# Patient Record
Sex: FEMALE | Race: ASIAN | NOT HISPANIC OR LATINO | Employment: FULL TIME | URBAN - METROPOLITAN AREA
[De-identification: names, ages, dates, MRNs, and addresses within clinical notes are randomized per-mention and may not be internally consistent; named-entity substitution may affect disease eponyms.]

---

## 2017-09-25 ENCOUNTER — AMBULATORY - HEALTHEAST (OUTPATIENT)
Dept: NURSING | Facility: CLINIC | Age: 32
End: 2017-09-25

## 2021-07-16 ENCOUNTER — HOSPITAL ENCOUNTER (EMERGENCY)
Dept: RADIOLOGY | Facility: CLINIC | Age: 36
End: 2021-07-16
Attending: EMERGENCY MEDICINE
Payer: COMMERCIAL

## 2021-07-16 ENCOUNTER — HOSPITAL ENCOUNTER (EMERGENCY)
Facility: CLINIC | Age: 36
Discharge: HOME | End: 2021-07-16
Attending: EMERGENCY MEDICINE | Admitting: EMERGENCY MEDICINE
Payer: COMMERCIAL

## 2021-07-16 VITALS
OXYGEN SATURATION: 99 % | BODY MASS INDEX: 29.27 KG/M2 | RESPIRATION RATE: 21 BRPM | HEIGHT: 61 IN | DIASTOLIC BLOOD PRESSURE: 75 MMHG | WEIGHT: 155 LBS | SYSTOLIC BLOOD PRESSURE: 122 MMHG | HEART RATE: 82 BPM

## 2021-07-16 DIAGNOSIS — R07.9 CHEST PAIN, UNSPECIFIED TYPE: Primary | ICD-10-CM

## 2021-07-16 LAB
ALBUMIN SERPL-MCNC: 3.9 G/DL (ref 3.5–5)
ALP SERPL-CCNC: 74 U/L (ref 45–120)
ALT SERPL W P-5'-P-CCNC: 11 U/L (ref 0–45)
ANION GAP SERPL CALCULATED.3IONS-SCNC: 11 MMOL/L (ref 5–18)
AST SERPL W P-5'-P-CCNC: 13 U/L (ref 0–40)
ATRIAL RATE - MUSE: 95 BPM
BILIRUB SERPL-MCNC: 0.5 MG/DL (ref 0–1)
BUN SERPL-MCNC: 17 MG/DL (ref 8–22)
CALCIUM SERPL-MCNC: 8.6 MG/DL (ref 8.5–10.5)
CHLORIDE BLD-SCNC: 106 MMOL/L (ref 98–107)
CK SERPL-CCNC: 79 U/L (ref 30–190)
CO2 SERPL-SCNC: 22 MMOL/L (ref 22–31)
CREAT SERPL-MCNC: 0.8 MG/DL (ref 0.6–1.1)
DIASTOLIC BLOOD PRESSURE - MUSE: 88 MMHG
ERYTHROCYTE [DISTWIDTH] IN BLOOD BY AUTOMATED COUNT: 12 % (ref 10–15)
GFR SERPL CREATININE-BSD FRML MDRD: >90 ML/MIN/1.73M2
GLUCOSE BLD-MCNC: 132 MG/DL (ref 70–125)
HCT VFR BLD AUTO: 35.9 % (ref 35–47)
HGB BLD-MCNC: 11.9 G/DL (ref 11.7–15.7)
INTERPRETATION ECG - MUSE: NORMAL
MAGNESIUM SERPL-MCNC: 1.8 MG/DL (ref 1.8–2.6)
MCH RBC QN AUTO: 29.4 PG (ref 26.5–33)
MCHC RBC AUTO-ENTMCNC: 33.1 G/DL (ref 31.5–36.5)
MCV RBC AUTO: 89 FL (ref 78–100)
P AXIS - MUSE: 65 DEGREES
PLATELET # BLD AUTO: 214 10E3/UL (ref 150–450)
POTASSIUM BLD-SCNC: 3.1 MMOL/L (ref 3.5–5)
PR INTERVAL - MUSE: 124 MS
PROT SERPL-MCNC: 6.9 G/DL (ref 6–8)
QRS DURATION - MUSE: 84 MS
QT - MUSE: 344 MS
QTC - MUSE: 432 MS
R AXIS - MUSE: 26 DEGREES
RBC # BLD AUTO: 4.05 10E6/UL (ref 3.8–5.2)
SODIUM SERPL-SCNC: 139 MMOL/L (ref 136–145)
SYSTOLIC BLOOD PRESSURE - MUSE: 177 MMHG
T AXIS - MUSE: 34 DEGREES
TROPONIN I SERPL-MCNC: 0.01 NG/ML (ref 0–0.29)
VENTRICULAR RATE- MUSE: 95 BPM
WBC # BLD AUTO: 9.8 10E3/UL (ref 4–11)

## 2021-07-16 PROCEDURE — 71045 X-RAY EXAM CHEST 1 VIEW: CPT

## 2021-07-16 PROCEDURE — 83735 ASSAY OF MAGNESIUM: CPT | Performed by: EMERGENCY MEDICINE

## 2021-07-16 PROCEDURE — 84484 ASSAY OF TROPONIN QUANT: CPT | Performed by: EMERGENCY MEDICINE

## 2021-07-16 PROCEDURE — 93005 ELECTROCARDIOGRAM TRACING: CPT | Performed by: EMERGENCY MEDICINE

## 2021-07-16 PROCEDURE — 80053 COMPREHEN METABOLIC PANEL: CPT | Performed by: EMERGENCY MEDICINE

## 2021-07-16 PROCEDURE — 85027 COMPLETE CBC AUTOMATED: CPT | Performed by: EMERGENCY MEDICINE

## 2021-07-16 PROCEDURE — 82550 ASSAY OF CK (CPK): CPT | Performed by: EMERGENCY MEDICINE

## 2021-07-16 PROCEDURE — 99285 EMERGENCY DEPT VISIT HI MDM: CPT | Mod: 25

## 2021-07-16 PROCEDURE — 96374 THER/PROPH/DIAG INJ IV PUSH: CPT

## 2021-07-16 PROCEDURE — 36592 COLLECT BLOOD FROM PICC: CPT | Performed by: EMERGENCY MEDICINE

## 2021-07-16 PROCEDURE — 250N000011 HC RX IP 250 OP 636: Performed by: EMERGENCY MEDICINE

## 2021-07-16 PROCEDURE — 36415 COLL VENOUS BLD VENIPUNCTURE: CPT | Performed by: EMERGENCY MEDICINE

## 2021-07-16 RX ORDER — POTASSIUM CHLORIDE 1.5 G/1.58G
40 POWDER, FOR SOLUTION ORAL ONCE
Status: DISCONTINUED | OUTPATIENT
Start: 2021-07-16 | End: 2021-07-16 | Stop reason: HOSPADM

## 2021-07-16 RX ORDER — HYDROXYZINE PAMOATE 25 MG/1
25 CAPSULE ORAL 3 TIMES DAILY PRN
Qty: 12 CAPSULE | Refills: 0 | Status: SHIPPED | OUTPATIENT
Start: 2021-07-16

## 2021-07-16 RX ORDER — LORAZEPAM 2 MG/ML
0.5 INJECTION INTRAMUSCULAR ONCE
Status: COMPLETED | OUTPATIENT
Start: 2021-07-16 | End: 2021-07-16

## 2021-07-16 RX ADMIN — LORAZEPAM 0.5 MG: 2 INJECTION INTRAMUSCULAR; INTRAVENOUS at 01:34

## 2021-07-16 ASSESSMENT — ENCOUNTER SYMPTOMS
VOMITING: 0
FEVER: 0
ABDOMINAL PAIN: 0
COUGH: 0
NAUSEA: 1
SHORTNESS OF BREATH: 1

## 2021-07-16 ASSESSMENT — MIFFLIN-ST. JEOR: SCORE: 1325.46

## 2021-07-16 NOTE — ED PROVIDER NOTES
EMERGENCY DEPARTMENT ENCOUNTER      NAME: Annette Dietz  AGE: 35 year old female  YOB: 1985  MRN: [id number]  EVALUATION DATE & TIME: 7/16/2021  1:08 AM    PCP: No primary care provider on file.    ED PROVIDER: Serge Horner M.D.      Chief Complaint   Patient presents with     Chest Pain     Shortness of Breath         FINAL IMPRESSION:  1. Chest pain, unspecified type          ED COURSE & MEDICAL DECISION MAKING:    Pertinent Labs & Imaging studies reviewed. (See chart for details)  35 year old female presents to the Emergency Department for evaluation of chest pain and paresthesias. Presentation atypical for ACS. HEART score = 0. Low risk of MACE in short term. Doubt PE in this patient without dyspnea, leg swelling, hemoptysis. No h/o recent travel or immobilization. No exogenous estrogen use. No h/o dvt/pe or neoplasm. CXR unremarkable. Patient and spouse believe presentation may be d/t anxiety from increased stress. Hydroxyzine prescribed. Strict return precautions and anticipatory guidance given.        ED Course as of Jul 16 0352 Fri Jul 16, 2021   0121 35-year-old female presents with complaint of burning in the left side of her chest her bilateral arms or bilateral lower extremities.  Patient reports symptoms began with patient got up to go the bathroom middle the night.  She denies history of similar episodes.  She does report increased workout today after not working out well.  She denies a history of estrogen use.  She denies a history of recent surgery or immobilization.  She is not had recent travel.  She does not have a personal or family history of DVT or pulmonary [facility].      0121 Presentation is atypical for acute coronary syndrome and EKG is nonischemic.  Doubt pulmonary embolism in this PERC negative female.      0122 Presentation is not consistent with aortic dissection.  Will obtain chest x-ray.  Will obtain electrolytes.  Will obtain CK.  Provide IV hydration.  Patient has  been asked about increased stress as a cause of this.  Presentation could certainly be consistent with anxiety attack or panic attack.  However, will attempt to rule out significant cardiopulmonary disease      0211 Troponin is negative.  Slightly decreased potassium noted.  CK unremarkable.      0211 Chest x-ray unremarkable      0350 Anion Gap: 11        1:15 AM I met with the patient, obtained history, performed an initial exam, and discussed options and plan for diagnostics and treatment here in the ED.  2:23 AM  Updated patient and spouse. Reevaluated the patient.     At the conclusion of the encounter I discussed the results of all of the tests and the disposition. The questions were answered. The patient or family acknowledged understanding and was agreeable with the care plan.       0 minutes of critical care time   No current facility-administered medications for this encounter.     Current Outpatient Medications   Medication     hydrOXYzine (VISTARIL) 25 MG capsule     cholecalciferol, vitamin D3, 5,000 unit Tab     MEDICATION CANNOT BE REORDERED - PLEASE MANUALLY REORDER AND DISCONTINUE THE OLD ORDER     PRENATAL VIT W-CA,FE,FA,<1 MG, (PRENATAL VITAMIN ORAL)       MEDICATIONS GIVEN IN THE EMERGENCY:  Medications   0.9% sodium chloride BOLUS (has no administration in time range)   potassium chloride (KLOR-CON) Packet 40 mEq (has no administration in time range)   LORazepam (ATIVAN) injection 0.5 mg (0.5 mg Intravenous Given 7/16/21 0134)       NEW PRESCRIPTIONS STARTED AT TODAY'S ER VISIT  Discharge Medication List as of 7/16/2021  2:50 AM      START taking these medications    Details   hydrOXYzine (VISTARIL) 25 MG capsule Take 1 capsule (25 mg) by mouth 3 times daily as needed for itching or anxiety, Disp-12 capsule, R-0, Local Print                =================================================================    HPI    Patient information was obtained from:     Use of Intrepreter: N/A      Annette Dietz  is a 35 year old female with a pertinent history of gestational diabetes who presents to this ED by walk in for evaluation of chest pain, shortness of breath.     The patient states that tonight just before arrival she developed sudden onset, sharp, left chest pain and she felt short of breath. Her arms started to burn and tingle and she says her heart was burning. She also felt nauseated but no vomiting. She states she worked out today for the first time in awhile and was wondering if this had something to do with it. She denies any cough, leg swelling or pain, or any other symptoms. She had her first dose of the Pfizer COVID vaccine about a week ago. She started taking penicillin for an ear infection 2-3 weeks ago. She denies any medical problems, daily medications, or prior surgeries.      REVIEW OF SYSTEMS   Review of Systems   Constitutional: Negative for fever.   Respiratory: Positive for shortness of breath. Negative for cough.    Cardiovascular: Positive for chest pain. Negative for leg swelling.   Gastrointestinal: Positive for nausea. Negative for abdominal pain and vomiting.   Neurological:        Positive for tingling (resolved)   All other systems reviewed and are negative.       PAST MEDICAL HISTORY:  No past medical history on file.    PAST SURGICAL HISTORY:  Past Surgical History:   Procedure Laterality Date     WISDOM TOOTH EXTRACTION      no comp with [place]           CURRENT MEDICATIONS:    hydrOXYzine (VISTARIL) 25 MG capsule  cholecalciferol, vitamin D3, 5,000 unit Tab  MEDICATION CANNOT BE REORDERED - PLEASE MANUALLY REORDER AND DISCONTINUE THE OLD ORDER  PRENATAL VIT W-CA,FE,FA,<1 MG, (PRENATAL VITAMIN ORAL)        ALLERGIES:  No Known Allergies    FAMILY HISTORY:  Family History   Problem Relation Age of Onset     Depression Mother      Hypertension Father      Hepatitis Brother      No Known Problems Sister      No Known Problems Daughter      No Known Problems Son      No Known Problems  "Paternal Grandmother      No Known Problems Sister        SOCIAL HISTORY:   Social History     Socioeconomic History     Marital status: Single     Spouse name: Not on file     Number of children: Not on file     Years of education: Not on file     Highest education level: Not on file   Occupational History     Not on file   Tobacco Use     Smoking status: Never Smoker   Substance and Sexual Activity     Alcohol use: No     Drug use: No     Sexual activity: Yes     Partners: Male     Birth control/protection: None   Other Topics Concern     Not on file   Social History Narrative     Not on file     Social Determinants of Health     Financial Resource Strain:      Difficulty of Paying Living Expenses:    Food Insecurity:      Worried About Running Out of Food in the Last Year:      Ran Out of Food in the Last Year:    Transportation Needs:      Lack of Transportation (Medical):      Lack of Transportation (Non-Medical):    Physical Activity:      Days of Exercise per Week:      Minutes of Exercise per Session:    Stress:      Feeling of Stress :    Social Connections:      Frequency of Communication with Friends and Family:      Frequency of Social Gatherings with Friends and Family:      Attends Shinto Services:      Active Member of Clubs or Organizations:      Attends Club or Organization Meetings:      Marital Status:    Intimate Partner Violence:      Fear of Current or Ex-Partner:      Emotionally Abused:      Physically Abused:      Sexually Abused:        VITALS:  Patient Vitals for the past 24 hrs:   BP Temp src Pulse Resp SpO2 Height Weight   07/16/21 0245 122/75 -- 82 21 99 % -- 70.3 kg (155 lb)   07/16/21 0230 -- -- 81 10 100 % -- --   07/16/21 0145 -- -- 85 8 100 % -- --   07/16/21 0130 (!) 146/78 -- 86 18 100 % -- --   07/16/21 0116 (!) 177/88 Oral 101 24 100 % 1.549 m (5' 1\") --   07/16/21 0115 (!) 158/68 -- 98 30 100 % -- --       PHYSICAL EXAM    General: A&O x 3 no apparent distress  HEENT: " PERRL, EOMI, moist mucous membranes  Neck: Supple, no rigidity  Cardiovascular: 2+ distal pulses, cap refill less than 2 seconds. RRR No m/r/g  Pulmonary: Chest nontender, symmetrical rise, normal effort, no respiratory distress. Clear to auscultation bilaterally.  Abdomen: Nontender, no distention. No rebound, guarding, or rigidity.  Extremities: No clubbing, cyanosis, or edema  Musculoskeletal: Gait normal; extremities atraumatic x4  Neuro: Cranial nerves II through XII intact, GCS 15; intact, symmetric strength and sensation x4 extremities  Skin: No rash, jaundice, pallor.  Warm dry and intact  Psych: anxious mood and affect       LAB:  All pertinent labs reviewed and interpreted.  Results for orders placed or performed during the hospital encounter of 07/16/21   XR Chest Port 1 View    Impression    IMPRESSION: Negative chest.   CBC with platelets   Result Value Ref Range    WBC Count 9.8 4.0 - 11.0 10e3/uL    RBC Count 4.05 3.80 - 5.20 10e6/uL    Hemoglobin 11.9 11.7 - 15.7 g/dL    Hematocrit 35.9 35.0 - 47.0 %    MCV 89 78 - 100 fL    MCH 29.4 26.5 - 33.0 pg    MCHC 33.1 31.5 - 36.5 g/dL    RDW 12.0 10.0 - 15.0 %    Platelet Count 214 150 - 450 10e3/uL   Comprehensive metabolic panel   Result Value Ref Range    Sodium 139 136 - 145 mmol/L    Potassium 3.1 (L) 3.5 - 5.0 mmol/L    Chloride 106 98 - 107 mmol/L    Carbon Dioxide (CO2) 22 22 - 31 mmol/L    Anion Gap 11 5 - 18 mmol/L    Urea Nitrogen 17 8 - 22 mg/dL    Creatinine 0.80 0.60 - 1.10 mg/dL    Calcium 8.6 8.5 - 10.5 mg/dL    Glucose 132 (H) 70 - 125 mg/dL    Alkaline Phosphatase 74 45 - 120 U/L    AST 13 0 - 40 U/L    ALT 11 0 - 45 U/L    Protein Total 6.9 6.0 - 8.0 g/dL    Albumin 3.9 3.5 - 5.0 g/dL    Bilirubin Total 0.5 0.0 - 1.0 mg/dL    GFR Estimate >90 >60 mL/min/1.73m2   Result Value Ref Range    Magnesium 1.8 1.8 - 2.6 mg/dL   Troponin I (now)   Result Value Ref Range    Troponin I 0.01 0.00 - 0.29 ng/mL   Result Value Ref Range    CK 79 30 - 190  U/L       RADIOLOGY:  Reviewed all pertinent imaging. Please see official radiology report.  XR Chest Port 1 View   Final Result   IMPRESSION: Negative chest.            EKG:    Performed at: 113    Impression: NSR, no significant ST changes,    Rate: 95   Rhythm: sinus  Axis: 65  26  34  ID Interval: 124  QRS Interval: 84  QTc Interval: 432  ST Changes: none  Comparison: no change when compared to 18 Jan 2016    I have independently reviewed and interpreted the EKG(s) documented above.      I, Guero Tim, am serving as a scribe to document services personally performed by Dr. Horner based on my observation and the provider's statements to me. I, Serge Horner MD attest that Guero Tim is acting in a scribe capacity, has observed my performance of the services and has documented them in accordance with my direction.  Any spelling or grammatic inconsistencies or inaccuracies are typographical or dictation errors.    Serge Horner M.D.  Emergency Medicine  [facility] EMERGENCY ROOM  [address] [facility] [address]  [phone]  Dept: [phone]     Serge Horner MD  07/16/21 2039

## 2021-07-16 NOTE — ED TRIAGE NOTES
Pt states that she was woken up by there younger child, she was sob went to the bathroom, laid down to sleep and then developed left sided chest pain that radiates down her left arm, here hands and lips became numb.  Her chest had a burning sensation  Pt had a covid shot x 1 week ago

## 2021-07-16 NOTE — DISCHARGE INSTRUCTIONS
The rapid access cardiology clinic will contact you to arrange follow-up    Return the emergency department your symptoms return or not relieved immediately by the hydroxyzine prescribed    Return to the emergency department immediately if any new or concerning symptoms occur

## 2021-07-18 ENCOUNTER — HEALTH MAINTENANCE LETTER (OUTPATIENT)
Age: 36
End: 2021-07-18

## 2021-07-23 ENCOUNTER — TELEPHONE (OUTPATIENT)
Dept: CARDIOLOGY | Facility: CLINIC | Age: 36
End: 2021-07-23

## 2021-09-12 ENCOUNTER — HEALTH MAINTENANCE LETTER (OUTPATIENT)
Age: 36
End: 2021-09-12

## 2022-04-06 ENCOUNTER — APPOINTMENT (OUTPATIENT)
Dept: RADIOLOGY | Facility: CLINIC | Age: 37
End: 2022-04-06
Attending: EMERGENCY MEDICINE
Payer: COMMERCIAL

## 2022-04-06 ENCOUNTER — APPOINTMENT (OUTPATIENT)
Dept: CT IMAGING | Facility: CLINIC | Age: 37
End: 2022-04-06
Attending: EMERGENCY MEDICINE
Payer: COMMERCIAL

## 2022-04-06 ENCOUNTER — HOSPITAL ENCOUNTER (EMERGENCY)
Facility: CLINIC | Age: 37
Discharge: HOME | End: 2022-04-06
Attending: EMERGENCY MEDICINE | Admitting: EMERGENCY MEDICINE
Payer: COMMERCIAL

## 2022-04-06 VITALS
BODY MASS INDEX: 23.6 KG/M2 | DIASTOLIC BLOOD PRESSURE: 80 MMHG | TEMPERATURE: 99 F | HEIGHT: 61 IN | SYSTOLIC BLOOD PRESSURE: 129 MMHG | HEART RATE: 72 BPM | RESPIRATION RATE: 20 BRPM | OXYGEN SATURATION: 99 % | WEIGHT: 125 LBS

## 2022-04-06 DIAGNOSIS — G44.219 EPISODIC TENSION-TYPE HEADACHE, NOT INTRACTABLE: ICD-10-CM

## 2022-04-06 DIAGNOSIS — R07.9 CHEST PAIN, UNSPECIFIED TYPE: ICD-10-CM

## 2022-04-06 LAB
ALBUMIN SERPL-MCNC: 4.3 G/DL (ref 3.5–5)
ALBUMIN UR-MCNC: NEGATIVE MG/DL
ALP SERPL-CCNC: 61 U/L (ref 45–120)
ALT SERPL W P-5'-P-CCNC: 12 U/L (ref 0–45)
ANION GAP SERPL CALCULATED.3IONS-SCNC: 10 MMOL/L (ref 5–18)
APPEARANCE UR: CLEAR
AST SERPL W P-5'-P-CCNC: 16 U/L (ref 0–40)
BASOPHILS # BLD AUTO: 0.1 10E3/UL (ref 0–0.2)
BASOPHILS NFR BLD AUTO: 1 %
BILIRUB SERPL-MCNC: 0.9 MG/DL (ref 0–1)
BILIRUB UR QL STRIP: NEGATIVE
BUN SERPL-MCNC: 11 MG/DL (ref 8–22)
CALCIUM SERPL-MCNC: 9.6 MG/DL (ref 8.5–10.5)
CHLORIDE BLD-SCNC: 102 MMOL/L (ref 98–107)
CO2 SERPL-SCNC: 26 MMOL/L (ref 22–31)
COLOR UR AUTO: ABNORMAL
CREAT SERPL-MCNC: 0.88 MG/DL (ref 0.6–1.1)
EOSINOPHIL # BLD AUTO: 0.1 10E3/UL (ref 0–0.7)
EOSINOPHIL NFR BLD AUTO: 2 %
ERYTHROCYTE [DISTWIDTH] IN BLOOD BY AUTOMATED COUNT: 12.1 % (ref 10–15)
GFR SERPL CREATININE-BSD FRML MDRD: 86 ML/MIN/1.73M2
GLUCOSE BLD-MCNC: 113 MG/DL (ref 70–125)
GLUCOSE UR STRIP-MCNC: NEGATIVE MG/DL
HCG UR QL: NEGATIVE
HCT VFR BLD AUTO: 40.9 % (ref 35–47)
HGB BLD-MCNC: 13.3 G/DL (ref 11.7–15.7)
HGB UR QL STRIP: NEGATIVE
IMM GRANULOCYTES # BLD: 0 10E3/UL
IMM GRANULOCYTES NFR BLD: 0 %
INTERNAL QC OK POCT: NORMAL
KETONES UR STRIP-MCNC: NEGATIVE MG/DL
LEUKOCYTE ESTERASE UR QL STRIP: NEGATIVE
LYMPHOCYTES # BLD AUTO: 1.4 10E3/UL (ref 0.8–5.3)
LYMPHOCYTES NFR BLD AUTO: 26 %
MCH RBC QN AUTO: 29.1 PG (ref 26.5–33)
MCHC RBC AUTO-ENTMCNC: 32.5 G/DL (ref 31.5–36.5)
MCV RBC AUTO: 90 FL (ref 78–100)
MONOCYTES # BLD AUTO: 0.6 10E3/UL (ref 0–1.3)
MONOCYTES NFR BLD AUTO: 11 %
NEUTROPHILS # BLD AUTO: 3.3 10E3/UL (ref 1.6–8.3)
NEUTROPHILS NFR BLD AUTO: 60 %
NITRATE UR QL: NEGATIVE
NRBC # BLD AUTO: 0 10E3/UL
NRBC BLD AUTO-RTO: 0 /100
PH UR STRIP: 6.5 [PH] (ref 5–7)
PLATELET # BLD AUTO: 248 10E3/UL (ref 150–450)
POCT KIT EXPIRATION DATE: NORMAL
POCT KIT LOT NUMBER: NORMAL
POTASSIUM BLD-SCNC: 3.9 MMOL/L (ref 3.5–5)
PROT SERPL-MCNC: 7.7 G/DL (ref 6–8)
RBC # BLD AUTO: 4.57 10E6/UL (ref 3.8–5.2)
RBC URINE: 0 /HPF
SODIUM SERPL-SCNC: 138 MMOL/L (ref 136–145)
SP GR UR STRIP: 1.01 (ref 1–1.03)
SQUAMOUS EPITHELIAL: 2 /HPF
TROPONIN I SERPL-MCNC: <0.01 NG/ML (ref 0–0.29)
UROBILINOGEN UR STRIP-MCNC: <2 MG/DL
WBC # BLD AUTO: 5.4 10E3/UL (ref 4–11)
WBC URINE: <1 /HPF

## 2022-04-06 PROCEDURE — 81001 URINALYSIS AUTO W/SCOPE: CPT | Performed by: EMERGENCY MEDICINE

## 2022-04-06 PROCEDURE — 96374 THER/PROPH/DIAG INJ IV PUSH: CPT | Mod: 59

## 2022-04-06 PROCEDURE — 250N000011 HC RX IP 250 OP 636: Performed by: EMERGENCY MEDICINE

## 2022-04-06 PROCEDURE — 93005 ELECTROCARDIOGRAM TRACING: CPT | Performed by: EMERGENCY MEDICINE

## 2022-04-06 PROCEDURE — 99285 EMERGENCY DEPT VISIT HI MDM: CPT | Mod: 25

## 2022-04-06 PROCEDURE — 84484 ASSAY OF TROPONIN QUANT: CPT | Performed by: EMERGENCY MEDICINE

## 2022-04-06 PROCEDURE — 71046 X-RAY EXAM CHEST 2 VIEWS: CPT

## 2022-04-06 PROCEDURE — 80053 COMPREHEN METABOLIC PANEL: CPT | Performed by: EMERGENCY MEDICINE

## 2022-04-06 PROCEDURE — 81025 URINE PREGNANCY TEST: CPT | Performed by: EMERGENCY MEDICINE

## 2022-04-06 PROCEDURE — 96375 TX/PRO/DX INJ NEW DRUG ADDON: CPT

## 2022-04-06 PROCEDURE — 85025 COMPLETE CBC W/AUTO DIFF WBC: CPT | Performed by: EMERGENCY MEDICINE

## 2022-04-06 PROCEDURE — 258N000003 HC RX IP 258 OP 636: Performed by: EMERGENCY MEDICINE

## 2022-04-06 PROCEDURE — 96361 HYDRATE IV INFUSION ADD-ON: CPT

## 2022-04-06 PROCEDURE — 36415 COLL VENOUS BLD VENIPUNCTURE: CPT | Performed by: EMERGENCY MEDICINE

## 2022-04-06 PROCEDURE — 70496 CT ANGIOGRAPHY HEAD: CPT

## 2022-04-06 RX ORDER — METOCLOPRAMIDE HYDROCHLORIDE 5 MG/ML
10 INJECTION INTRAMUSCULAR; INTRAVENOUS ONCE
Status: COMPLETED | OUTPATIENT
Start: 2022-04-06 | End: 2022-04-06

## 2022-04-06 RX ORDER — SUCRALFATE 1 G/1
1 TABLET ORAL 4 TIMES DAILY PRN
Qty: 30 TABLET | Refills: 0 | Status: SHIPPED | OUTPATIENT
Start: 2022-04-06

## 2022-04-06 RX ORDER — KETOROLAC TROMETHAMINE 15 MG/ML
15 INJECTION, SOLUTION INTRAMUSCULAR; INTRAVENOUS ONCE
Status: COMPLETED | OUTPATIENT
Start: 2022-04-06 | End: 2022-04-06

## 2022-04-06 RX ORDER — DIPHENHYDRAMINE HYDROCHLORIDE 50 MG/ML
25 INJECTION INTRAMUSCULAR; INTRAVENOUS ONCE
Status: COMPLETED | OUTPATIENT
Start: 2022-04-06 | End: 2022-04-06

## 2022-04-06 RX ORDER — IOPAMIDOL 755 MG/ML
100 INJECTION, SOLUTION INTRAVASCULAR ONCE
Status: COMPLETED | OUTPATIENT
Start: 2022-04-06 | End: 2022-04-06

## 2022-04-06 RX ADMIN — SODIUM CHLORIDE 1000 ML: 9 INJECTION, SOLUTION INTRAVENOUS at 11:06

## 2022-04-06 RX ADMIN — KETOROLAC TROMETHAMINE 15 MG: 15 INJECTION, SOLUTION INTRAMUSCULAR; INTRAVENOUS at 11:00

## 2022-04-06 RX ADMIN — IOPAMIDOL 100 ML: 755 INJECTION, SOLUTION INTRAVENOUS at 11:30

## 2022-04-06 RX ADMIN — METOCLOPRAMIDE HYDROCHLORIDE 10 MG: 5 INJECTION INTRAMUSCULAR; INTRAVENOUS at 10:57

## 2022-04-06 RX ADMIN — DIPHENHYDRAMINE HYDROCHLORIDE 25 MG: 50 INJECTION INTRAMUSCULAR; INTRAVENOUS at 10:55

## 2022-04-06 ASSESSMENT — ENCOUNTER SYMPTOMS
DIZZINESS: 0
SHORTNESS OF BREATH: 0
HEADACHES: 1
FLANK PAIN: 1
NUMBNESS: 1
NECK PAIN: 1
FEVER: 0
COUGH: 0
FATIGUE: 0
ABDOMINAL PAIN: 0
VOMITING: 0
WEAKNESS: 0
CHILLS: 0
DYSURIA: 0
DIARRHEA: 0

## 2022-04-06 NOTE — DISCHARGE INSTRUCTIONS
Continue home medications  May use tylenol or excedrin for any ongoing headache.  Fluid hydrate.  Symptoms may be related to a complex migraine  Add carafate for your chest pain and eating difficulties  Follow up closely with your primary doctor

## 2022-04-06 NOTE — ED TRIAGE NOTES
"Pt arrives to ED with c/o chest pain, headache, left shoulder pain that radiates down left arm, left arm and left leg tingling/numbness since saturday. Pt also states \"my organs hurt, I wake up super weak and have low back pain\". Pt has hx of high cholesterol and anxiety. Hypertensive in traige. Denies shortness of breath.     "

## 2022-04-06 NOTE — ED PROVIDER NOTES
EMERGENCY DEPARTMENT ENCOUNTER      NAME: Annette Dietz  AGE: 36 year old female  YOB: 1985  MRN: [id number]  EVALUATION DATE & TIME: 4/6/2022 10:27 AM    PCP: No Ref-Primary, Physician    ED PROVIDER: Katia Kessler DO      Chief Complaint   Patient presents with     Chest Pain     Headache     Numbness         FINAL IMPRESSION:  1. Episodic tension-type headache, not intractable    2. Chest pain, unspecified type          ED COURSE & MEDICAL DECISION MAKING:    Pertinent Labs & Imaging studies reviewed. (See chart for details)  10:37 AM I met the patient and performed my initial interview and exam.  12:48 PM I rechecked the patient on results. Her symptoms have resolved.   We discussed the plan for discharge and the patient is agreeable. Reviewed supportive cares, symptomatic treatment, outpatient follow up, and reasons to return to the Emergency Department. All questions and concerns were addressed. Patient to be discharged by ED RN.       36 year old female presents to the Emergency Department for evaluation of  chest pain, headache, neck pain, back pain, left-sided numbness.  Symptoms seem to start several days ago with some burning in the chest.  Then noted some tingling in her left hand.  Now has left-sided headache, neck pain and paresthesias to her left side of her body.  She also notes bilateral flank pain.  She is nontoxic-appearing.  No history of trauma to make me think she has a bleed or traumatic dissection.  Although she has these paresthesias, she has no weakness, she is ambulatory independently without any difficulty, weakness to the left upper and lower face.  Not consistent with an acute ischemic stroke.  She would be out of any timeframe for a thrombolytic.  EKG obtained and shows normal sinus rhythm without evidence of arrhythmia or ischemia.  Labs are unremarkable.  Troponins not elevated.  Given length of time since symptom onset and reassuring EKG, I feel ACS can be ruled out at  this time.  Chest x-ray is clear.  No elevation in LFTs or bilirubin to suggest choledocholithiasis, cholelithiasis.  CTA of the head and neck unremarkable.  No bleed, critical stenosis or dissection.  Patient was given a migraine cocktail and fluids in the ED with significant improvement in her symptoms.  We discussed results.  Given her decreased food tolerance her GERD may be acting up.  I will add on Carafate.  Her neurologic symptoms may be a complex migraine.  We discussed symptomatic care at home.  She may try Excedrin for any recurrent headaches, encourage fluid hydration close follow-up with her primary provider.  Encourage immediate return if any acute worsening symptoms, speech difficulties, weakness in her extremities or any other concerns    At the conclusion of the encounter I discussed the results of all of the tests and the disposition. The questions were answered. The patient or family acknowledged understanding and was agreeable with the care plan.         MEDICATIONS GIVEN IN THE EMERGENCY:  Medications   ketorolac (TORADOL) injection 15 mg (15 mg Intravenous Given 4/6/22 1100)   metoclopramide (REGLAN) injection 10 mg (10 mg Intravenous Given 4/6/22 1057)   diphenhydrAMINE (BENADRYL) injection 25 mg (25 mg Intravenous Given 4/6/22 1055)   0.9% sodium chloride BOLUS (0 mLs Intravenous Stopped 4/6/22 1238)   iopamidol (ISOVUE-370) solution 100 mL (100 mLs Intravenous Given 4/6/22 1130)       NEW PRESCRIPTIONS STARTED AT TODAY'S ER VISIT  Discharge Medication List as of 4/6/2022  1:04 PM      START taking these medications    Details   sucralfate (CARAFATE) 1 GM tablet Take 1 tablet (1 g) by mouth 4 times daily as needed for nausea (chest pain), Disp-30 tablet, R-0, Local Print                =================================================================    HPI    Patient information was obtained from: patient     Use of : N/A         Annette Dietz is a 36 year old female with a pertinent  "history of hypercholesterolemia, anxiety, GERD, who presents to this ED by private car for evaluation of chest pain, headache.     On 4/2 (4 days ago), the patient started having intermittent chest pain and left arm numbness. That night while eating a greasy food she started to feel her \"heart acting up,\" which resolved when she switched to a salad. Describes chest pain as tingling then burning sensation. This morning her symptoms were particularly bothersome with left hand and leg  numbness, headache, unsteadiness, and bilateral flank pain. Describes headache as intense throbbing, burning/hot sensation on left side of head with radiation down left side of neck. Associated with some blurry vision. Headache feels different than migraines she had many years ago. No medications taken for symptoms. Reports she was previously evaluated by PCP for chest discomfort and sensory changes in extremities, attributed to anxiety and GERD. She takes omeprazole daily. Patient does not think her current symptoms are related to anxiety or GERD, instead suspects it they may be related to her high cholesterol which she has been trying to control with diet changes. Additionally notes a chronic rash on neck she attributes to eczema. She is B12 deficient. Denies chance of pregnancy. Has IUD in place and doesn't menstruate. Denies dizziness, weakness, vomiting, diarrhea.     REVIEW OF SYSTEMS   Review of Systems   Constitutional: Negative for chills, fatigue and fever.   Eyes: Positive for visual disturbance (blurred).   Respiratory: Negative for cough and shortness of breath.    Cardiovascular: Positive for chest pain.   Gastrointestinal: Negative for abdominal pain, diarrhea and vomiting.   Genitourinary: Positive for flank pain (bilateral). Negative for dysuria.   Musculoskeletal: Positive for neck pain (left sided).   Skin: Positive for rash (neck).   Neurological: Positive for numbness and headaches (left arm, leg). Negative for " "dizziness, syncope and weakness.        Positive for unsteadiness.   All other systems reviewed and are negative.      PAST MEDICAL HISTORY:  No past medical history on file.    PAST SURGICAL HISTORY:  Past Surgical History:   Procedure Laterality Date     WISDOM TOOTH EXTRACTION      no comp with [place]           CURRENT MEDICATIONS:    sucralfate (CARAFATE) 1 GM tablet  cholecalciferol, vitamin D3, 5,000 unit Tab  hydrOXYzine (VISTARIL) 25 MG capsule  MEDICATION CANNOT BE REORDERED - PLEASE MANUALLY REORDER AND DISCONTINUE THE OLD ORDER  PRENATAL VIT W-CA,FE,FA,<1 MG, (PRENATAL VITAMIN ORAL)         ALLERGIES:  No Known Allergies    FAMILY HISTORY:  Family History   Problem Relation Age of Onset     Depression Mother      Hypertension Father      Hepatitis Brother      No Known Problems Sister      No Known Problems Daughter      No Known Problems Son      No Known Problems Paternal Grandmother      No Known Problems Sister        SOCIAL HISTORY:   Social History     Socioeconomic History     Marital status: Single     Spouse name: Not on file     Number of children: Not on file     Years of education: Not on file     Highest education level: Not on file   Occupational History     Not on file   Tobacco Use     Smoking status: Never Smoker     Smokeless tobacco: Not on file   Substance and Sexual Activity     Alcohol use: No     Drug use: No     Sexual activity: Yes     Partners: Male     Birth control/protection: None   Other Topics Concern     Not on file   Social History Narrative     Not on file     Social Determinants of Health     Financial Resource Strain: Not on file   Food Insecurity: Not on file   Transportation Needs: Not on file   Physical Activity: Not on file   Stress: Not on file   Social Connections: Not on file   Intimate Partner Violence: Not on file   Housing Stability: Not on file       VITALS:  /80   Pulse 72   Temp 99  F (37.2  C) (Oral)   Resp 20   Ht 1.549 m (5' 1\")   Wt 56.7 kg (125 " lb)   SpO2 99%   BMI 23.62 kg/m      PHYSICAL EXAM    Physical Exam  Constitutional:       General: She is not in acute distress.  HENT:      Head: Normocephalic and atraumatic.      Mouth/Throat:      Pharynx: Oropharynx is clear.   Eyes:      Pupils: Pupils are equal, round, and reactive to light.   Cardiovascular:      Rate and Rhythm: Normal rate and regular rhythm.      Pulses: Normal pulses.      Heart sounds: Normal heart sounds.   Pulmonary:      Effort: Pulmonary effort is normal.      Breath sounds: Normal breath sounds.   Abdominal:      General: Abdomen is flat. Bowel sounds are normal.      Palpations: Abdomen is soft.      Tenderness: There is no abdominal tenderness. There is right CVA tenderness and left CVA tenderness.   Musculoskeletal:         General: Normal range of motion.   Skin:     General: Skin is warm and dry.      Capillary Refill: Capillary refill takes less than 2 seconds.   Neurological:      General: No focal deficit present.      Mental Status: She is alert and oriented to person, place, and time.      Cranial Nerves: Cranial nerves are intact.      Motor: Motor function is intact.      Gait: Gait is intact.      Comments: Subjective paresthesias to left upper and lower face, left arm, and left leg          LAB:  All pertinent labs reviewed and interpreted.  Labs Ordered and Resulted from Time of ED Arrival to Time of ED Departure   ROUTINE UA WITH MICROSCOPIC REFLEX TO CULTURE - Abnormal       Result Value    Color Urine Light Yellow      Appearance Urine Clear      Glucose Urine Negative      Bilirubin Urine Negative      Ketones Urine Negative      Specific Gravity Urine 1.006      Blood Urine Negative      pH Urine 6.5      Protein Albumin Urine Negative      Urobilinogen Urine <2.0      Nitrite Urine Negative      Leukocyte Esterase Urine Negative      RBC Urine 0      WBC Urine <1      Squamous Epithelials Urine 2 (*)    COMPREHENSIVE METABOLIC PANEL - Normal    Sodium 138       Potassium 3.9      Chloride 102      Carbon Dioxide (CO2) 26      Anion Gap 10      Urea Nitrogen 11      Creatinine 0.88      Calcium 9.6      Glucose 113      Alkaline Phosphatase 61      AST 16      ALT 12      Protein Total 7.7      Albumin 4.3      Bilirubin Total 0.9      GFR Estimate 86     TROPONIN I - Normal    Troponin I <0.01     HCG QUALITATIVE URINE POCT - Normal    HCG Qual Urine Negative      Internal QC Check POCT Valid      POCT Kit Lot Number [id number]      POCT Kit Expiration Date 7/31/23     CBC WITH PLATELETS AND DIFFERENTIAL    WBC Count 5.4      RBC Count 4.57      Hemoglobin 13.3      Hematocrit 40.9      MCV 90      MCH 29.1      MCHC 32.5      RDW 12.1      Platelet Count 248      % Neutrophils 60      % Lymphocytes 26      % Monocytes 11      % Eosinophils 2      % Basophils 1      % Immature Granulocytes 0      NRBCs per 100 WBC 0      Absolute Neutrophils 3.3      Absolute Lymphocytes 1.4      Absolute Monocytes 0.6      Absolute Eosinophils 0.1      Absolute Basophils 0.1      Absolute Immature Granulocytes 0.0      Absolute NRBCs 0.0         RADIOLOGY:  Reviewed all pertinent imaging. Please see official radiology report.  CTA Head Neck with Contrast   Final Result   IMPRESSION:    HEAD CT:   1.  No evidence of acute hemorrhage, mass effect, or acute vascular territorial infarction.      HEAD CTA:    1.  No evidence of proximal large vessel occlusion or flow-limiting stenosis.      NECK CTA:   1.  No evidence of hemodynamically significant stenosis based on NASCET criteria.   2.  No evidence of dissection or pseudoaneurysm.      Chest XR,  PA & LAT   Final Result   IMPRESSION: No pleural fluid or pneumothorax. No airspace disease or edema. Normal size of the heart.          EKG:    Performed at: 0958    Impression: Sinus rhythm. Normal ECG.      Rate: 98  Rhythm: sinus   Axis: 20  ME Interval: 114  QRS Interval: 70  QTc Interval: 434  ST Changes: none  Comparison: When compared with  ECG of 7/16/2021 0113, no significant change was found.    I have independently reviewed and interpreted the EKG(s) documented above.      I, Misti Chaudhary, am serving as a scribe to document services personally performed by Dr. Katia Kessler based on my observation and the provider's statements to me. I, Katia Kessler, DO attest that  Misti Chaudhary is acting in a scribe capacity, has observed my performance of the services and has documented them in accordance with my direction.    Katia Kessler DO  Emergency Medicine  [facility] EMERGENCY ROOM  [address] [facility] [address]  [phone]  Dept: [phone]     Katia Kessler DO  04/06/22 1557

## 2022-04-07 LAB
ATRIAL RATE - MUSE: 98 BPM
DIASTOLIC BLOOD PRESSURE - MUSE: NORMAL MMHG
INTERPRETATION ECG - MUSE: NORMAL
P AXIS - MUSE: 72 DEGREES
PR INTERVAL - MUSE: 114 MS
QRS DURATION - MUSE: 70 MS
QT - MUSE: 340 MS
QTC - MUSE: 434 MS
R AXIS - MUSE: 20 DEGREES
SYSTOLIC BLOOD PRESSURE - MUSE: NORMAL MMHG
T AXIS - MUSE: 11 DEGREES
VENTRICULAR RATE- MUSE: 98 BPM

## 2022-06-01 LAB
BASOPHILS # BLD AUTO: 0.1 10E3/UL (ref 0–0.2)
BASOPHILS NFR BLD AUTO: 1 %
EOSINOPHIL # BLD AUTO: 0.1 10E3/UL (ref 0–0.7)
EOSINOPHIL NFR BLD AUTO: 2 %
ERYTHROCYTE [DISTWIDTH] IN BLOOD BY AUTOMATED COUNT: 12.1 % (ref 10–15)
HCT VFR BLD AUTO: 37.9 % (ref 35–47)
HGB BLD-MCNC: 12.5 G/DL (ref 11.7–15.7)
IMM GRANULOCYTES # BLD: 0 10E3/UL
IMM GRANULOCYTES NFR BLD: 0 %
LYMPHOCYTES # BLD AUTO: 2.4 10E3/UL (ref 0.8–5.3)
LYMPHOCYTES NFR BLD AUTO: 33 %
MCH RBC QN AUTO: 29 PG (ref 26.5–33)
MCHC RBC AUTO-ENTMCNC: 33 G/DL (ref 31.5–36.5)
MCV RBC AUTO: 88 FL (ref 78–100)
MONOCYTES # BLD AUTO: 0.5 10E3/UL (ref 0–1.3)
MONOCYTES NFR BLD AUTO: 7 %
NEUTROPHILS # BLD AUTO: 4.3 10E3/UL (ref 1.6–8.3)
NEUTROPHILS NFR BLD AUTO: 57 %
NRBC # BLD AUTO: 0 10E3/UL
NRBC BLD AUTO-RTO: 0 /100
PLATELET # BLD AUTO: 226 10E3/UL (ref 150–450)
RBC # BLD AUTO: 4.31 10E6/UL (ref 3.8–5.2)
WBC # BLD AUTO: 7.4 10E3/UL (ref 4–11)

## 2022-06-01 PROCEDURE — 36415 COLL VENOUS BLD VENIPUNCTURE: CPT | Performed by: EMERGENCY MEDICINE

## 2022-06-01 PROCEDURE — 93005 ELECTROCARDIOGRAM TRACING: CPT | Performed by: EMERGENCY MEDICINE

## 2022-06-01 PROCEDURE — 86140 C-REACTIVE PROTEIN: CPT | Performed by: EMERGENCY MEDICINE

## 2022-06-01 PROCEDURE — 85025 COMPLETE CBC W/AUTO DIFF WBC: CPT | Performed by: EMERGENCY MEDICINE

## 2022-06-01 PROCEDURE — 83690 ASSAY OF LIPASE: CPT | Performed by: EMERGENCY MEDICINE

## 2022-06-01 PROCEDURE — 84484 ASSAY OF TROPONIN QUANT: CPT | Performed by: EMERGENCY MEDICINE

## 2022-06-01 PROCEDURE — 80053 COMPREHEN METABOLIC PANEL: CPT | Performed by: EMERGENCY MEDICINE

## 2022-06-01 PROCEDURE — 99285 EMERGENCY DEPT VISIT HI MDM: CPT | Mod: 25

## 2022-06-02 ENCOUNTER — HOSPITAL ENCOUNTER (EMERGENCY)
Facility: CLINIC | Age: 37
Discharge: HOME | End: 2022-06-02
Attending: EMERGENCY MEDICINE | Admitting: EMERGENCY MEDICINE
Payer: COMMERCIAL

## 2022-06-02 ENCOUNTER — APPOINTMENT (OUTPATIENT)
Dept: RADIOLOGY | Facility: CLINIC | Age: 37
End: 2022-06-02
Attending: EMERGENCY MEDICINE
Payer: COMMERCIAL

## 2022-06-02 VITALS
WEIGHT: 120 LBS | BODY MASS INDEX: 22.67 KG/M2 | SYSTOLIC BLOOD PRESSURE: 138 MMHG | OXYGEN SATURATION: 100 % | TEMPERATURE: 98.4 F | RESPIRATION RATE: 24 BRPM | DIASTOLIC BLOOD PRESSURE: 85 MMHG | HEART RATE: 58 BPM

## 2022-06-02 DIAGNOSIS — F41.9 ANXIETY: ICD-10-CM

## 2022-06-02 DIAGNOSIS — R07.89 CHEST WALL PAIN: ICD-10-CM

## 2022-06-02 DIAGNOSIS — H53.9 VISUAL DISTURBANCE: ICD-10-CM

## 2022-06-02 DIAGNOSIS — R03.0 ELEVATED BLOOD PRESSURE READING WITHOUT DIAGNOSIS OF HYPERTENSION: ICD-10-CM

## 2022-06-02 LAB
ALBUMIN SERPL-MCNC: 4.3 G/DL (ref 3.5–5)
ALP SERPL-CCNC: 59 U/L (ref 45–120)
ALT SERPL W P-5'-P-CCNC: 15 U/L (ref 0–45)
ANION GAP SERPL CALCULATED.3IONS-SCNC: 10 MMOL/L (ref 5–18)
AST SERPL W P-5'-P-CCNC: 18 U/L (ref 0–40)
BILIRUB SERPL-MCNC: 0.7 MG/DL (ref 0–1)
BUN SERPL-MCNC: 17 MG/DL (ref 8–22)
C REACTIVE PROTEIN LHE: <0.1 MG/DL (ref 0–0.8)
CALCIUM SERPL-MCNC: 9.6 MG/DL (ref 8.5–10.5)
CHLORIDE BLD-SCNC: 107 MMOL/L (ref 98–107)
CO2 SERPL-SCNC: 23 MMOL/L (ref 22–31)
CREAT SERPL-MCNC: 0.78 MG/DL (ref 0.6–1.1)
D DIMER PPP FEU-MCNC: 0.38 UG/ML FEU (ref 0–0.5)
GFR SERPL CREATININE-BSD FRML MDRD: >90 ML/MIN/1.73M2
GLUCOSE BLD-MCNC: 137 MG/DL (ref 70–125)
LIPASE SERPL-CCNC: 62 U/L (ref 0–52)
POTASSIUM BLD-SCNC: 3.7 MMOL/L (ref 3.5–5)
PROT SERPL-MCNC: 7.6 G/DL (ref 6–8)
SODIUM SERPL-SCNC: 140 MMOL/L (ref 136–145)
TROPONIN I SERPL-MCNC: <0.01 NG/ML (ref 0–0.29)

## 2022-06-02 PROCEDURE — 36415 COLL VENOUS BLD VENIPUNCTURE: CPT | Performed by: EMERGENCY MEDICINE

## 2022-06-02 PROCEDURE — 85379 FIBRIN DEGRADATION QUANT: CPT | Performed by: EMERGENCY MEDICINE

## 2022-06-02 PROCEDURE — 71046 X-RAY EXAM CHEST 2 VIEWS: CPT

## 2022-06-02 ASSESSMENT — ENCOUNTER SYMPTOMS
COUGH: 0
PALPITATIONS: 1
SORE THROAT: 0
SHORTNESS OF BREATH: 0
NUMBNESS: 1
CHILLS: 0
FEVER: 0

## 2022-06-02 NOTE — ED TRIAGE NOTES
Presents to the ED with C/O epigastric and L sided chest pain that started a few months ago   Also C/O numbness and blurry vision   Pt was seen for this issue on Sunday where pt was diagnosed with H pylori and given famotedine prescription for history of stomach ulcers     Patient has been recently having high blood pressures as well, has been diagnosed with anxiety as well     Triage Assessment     Row Name 06/01/22 7826       Triage Assessment (Adult)    Airway WDL WDL       Respiratory WDL    Respiratory WDL WDL       Skin Circulation/Temperature WDL    Skin Circulation/Temperature WDL WDL       Cardiac WDL    Cardiac WDL X;chest pain       Chest Pain Assessment    Chest Pain Location epigastric;anterior chest, left       Peripheral/Neurovascular WDL    Peripheral Neurovascular WDL WDL       Cognitive/Neuro/Behavioral WDL    Cognitive/Neuro/Behavioral WDL WDL

## 2022-06-02 NOTE — ED PROVIDER NOTES
Emergency Department Encounter      NAME: Annette Dietz  AGE: 36 year old female  YOB: 1985  MRN: [id number]  EVALUATION DATE & TIME: 2022  1:49 AM    PCP: Rachael Fowler    ED PROVIDER: Nilay Stewart M.D.      Chief Complaint   Patient presents with     Chest Pain         FINAL IMPRESSION:  1. Elevated blood pressure reading without diagnosis of hypertension    2. Chest wall pain    3. Anxiety    4. Visual disturbance        MEDICAL DECISION MAKIN:12 AM I met with the patient, obtained history, performed an initial exam, and discussed options and plan for diagnostics and treatment here in the ED. PPE worn including surgical mask, surgical gloves.    This patient is a 36-year-old female who presents with multiple symptoms.  Her  was concerned that this could be due to anxiety.  Upon chart review the patient had been into the ER multiple times for similar symptoms with negative work-ups in the past, the last visit was on 29 May at [facility].  The patient again has a number of symptoms including heart palpitations, left arm and both legs numbness, paresthesias, a dull pain in her chest.  She has been checking her blood pressure repeatedly earlier today and it was elevated although in the ER it was 138/85.  She has been having some blurry vision and wears eyeglasses.  She was encouraged to follow-up with her optometrist or ophthalmologist.  She did not have any significant blurriness of vision in the ER.  Her work-up in the ER was unremarkable.  I suspect that the symptoms were due to her anxiety and I will have her follow-up with her doctor for further discussion of the symptoms.    Pertinent Labs & Imaging studies reviewed. (See chart for details)    The importance of close follow up was discussed. We reviewed warning signs and symptoms, and I instructed Ms. Dietz to return to the emergency department immediately if she develops any new or worsening symptoms. I provided  "additional verbal discharge instructions. Ms. Dietz expressed understanding and agreement with this plan of care, her questions were answered, and she was discharged in stable condition.       MEDICATIONS GIVEN IN THE EMERGENCY:  Medications - No data to display    NEW PRESCRIPTIONS STARTED AT TODAY'S ER VISIT:  New Prescriptions    No medications on file          =================================================================    HPI    Patient information was obtained from: Patient    Use of : N/A      Annette Dietz is a 36 year old female with a past medical history of hyperlipidemia who presents by walk in with her  for the evaluation of chest pain, multiple symptoms. Patient reports symptoms of palpitations (rapid heart rate), left arm and bilateral leg numbness/paraestesias, a \"weird sensation\" in her chest which she states is a dull pain. Patient states she was diagnosed with anxiety attack 1 year ago and has been diagnosed with this in multiple emergency departments since then, but patient is adamant her symptoms are not due to anxiety. Earlier today, patient was checking her blood pressure and states her highest reading was 175/110. Patient is not on antihypertensive medications. She also currently endorses some blurry vision, which she has had in the past. Patient does wear eyeglasses at night when she is driving. She has not seen an ophthalmologist or had an eye vision test. She believes her blurred vision is associated with her hypertension. She currently only endorses mild tingling in her feet.    Of note, patient was diagnosed with H. Pylori on 05/10 and recently finished taking her antibiotics on 05/20. Patient has been taking omeprazole for years which she continues to take. No other reported complaints at this time.    Per chart review, patient has been seen in the ED multiple times for similar symptoms in the past with negative work ups. Last visit from 05/29/22 at [facility].    REVIEW OF SYSTEMS   Review of Systems   Constitutional: Negative for chills and fever.   HENT: Negative for congestion and sore throat.    Eyes: Positive for visual disturbance (mild blurred vision).   Respiratory: Negative for cough and shortness of breath (current).    Cardiovascular: Positive for chest pain (dull, \"weird sensation\") and palpitations (rapid heart rate).   Neurological: Positive for numbness (left arm, bilateral legs, resolved).        Positive for paraesthesias (bilateral feet).   All other systems reviewed and are negative.       PAST MEDICAL HISTORY:  No past medical history on file.    PAST SURGICAL HISTORY:  Past Surgical History:   Procedure Laterality Date     WISDOM TOOTH EXTRACTION      no comp with [place]       CURRENT MEDICATIONS:    No current facility-administered medications for this encounter.    Current Outpatient Medications:      cholecalciferol, vitamin D3, 5,000 unit Tab, [CHOLECALCIFEROL, VITAMIN D3, 5,000 UNIT TAB] Take 5,000 Units by mouth daily., Disp: , Rfl:      hydrOXYzine (VISTARIL) 25 MG capsule, Take 1 capsule (25 mg) by mouth 3 times daily as needed for itching or anxiety, Disp: 12 capsule, Rfl: 0     MEDICATION CANNOT BE REORDERED - PLEASE MANUALLY REORDER AND DISCONTINUE THE OLD ORDER, [DOCOSHEXANOIC ACID-EICOSAPENT 500 MG (FISH OIL) 500-100 MG CAP CAPSULE] Take 1,000 mg by mouth daily., Disp: , Rfl:      PRENATAL VIT W-CA,FE,FA,<1 MG, (PRENATAL VITAMIN ORAL), [PRENATAL VIT W-CA,FE,FA,<1 MG, (PRENATAL VITAMIN ORAL)] Take 1 tablet by mouth daily. , Disp: , Rfl:      sucralfate (CARAFATE) 1 GM tablet, Take 1 tablet (1 g) by mouth 4 times daily as needed for nausea (chest pain), Disp: 30 tablet, Rfl: 0    ALLERGIES:  No Known Allergies    FAMILY HISTORY:  Family History   Problem Relation Age of Onset     Depression Mother      Hypertension Father      Hepatitis Brother      No Known Problems Sister      No Known Problems Daughter      No Known Problems " Son      No Known Problems Paternal Grandmother      No Known Problems Sister        SOCIAL HISTORY:   Social History     Socioeconomic History     Marital status: Single   Tobacco Use     Smoking status: Never Smoker   Substance and Sexual Activity     Alcohol use: No     Drug use: No     Sexual activity: Yes     Partners: Male     Birth control/protection: None       PHYSICAL EXAM:    Vitals: BP (!) 163/89   Pulse 63   Temp 98.5  F (36.9  C) (Temporal)   Resp 15   Wt 54.4 kg (120 lb)   SpO2 100%   BMI 22.67 kg/m     Constitutional: Well developed, well nourished. Comfortable appearing.  HEAD:Normocephalic, atraumatic,   Eyes: PERRLA, EOM intact, conjunctiva clear, no discharge  ENT: mucous membranes moist, nose normal.   Neck- Supple, gross ROM intact.  No JVD.  No palpable nodes.  Pulmonary: Clear to auscultation bilaterally, no respiratory distress, no wheezing, speaks full sentences easily.  Chest: No chest wall tenderness  Cardiovascular: Normal heart rate, regular rhythm, no murmurs. No lower extremity edema, 2+ DP pulses.   GI: Soft, no tenderness to deep palpation in all quadrants, not distended, no masses.  No hepatosplenomegaly.  Musculoskeletal: Moving all 4 extremities intentionally and without pain. No obvious deformity.  Back: No CVA tenderness  Skin: Warm, dry, no rash.  Neurologic: Alert & oriented x 3, speech clear, moving all extremities spontaneously   Psychiatric: Affect normal, cooperative.     LAB:  All pertinent labs reviewed and interpreted.  Labs Ordered and Resulted from Time of ED Arrival to Time of ED Departure   COMPREHENSIVE METABOLIC PANEL - Abnormal       Result Value    Sodium 140      Potassium 3.7      Chloride 107      Carbon Dioxide (CO2) 23      Anion Gap 10      Urea Nitrogen 17      Creatinine 0.78      Calcium 9.6      Glucose 137 (*)     Alkaline Phosphatase 59      AST 18      ALT 15      Protein Total 7.6      Albumin 4.3      Bilirubin Total 0.7      GFR Estimate  >90     LIPASE - Abnormal    Lipase 62 (*)    TROPONIN I - Normal    Troponin I <0.01     CRP INFLAMMATION - Normal    CRP <0.1     D DIMER QUANTITATIVE - Normal    D-Dimer Quantitative 0.38     CBC WITH PLATELETS AND DIFFERENTIAL    WBC Count 7.4      RBC Count 4.31      Hemoglobin 12.5      Hematocrit 37.9      MCV 88      MCH 29.0      MCHC 33.0      RDW 12.1      Platelet Count 226      % Neutrophils 57      % Lymphocytes 33      % Monocytes 7      % Eosinophils 2      % Basophils 1      % Immature Granulocytes 0      NRBCs per 100 WBC 0      Absolute Neutrophils 4.3      Absolute Lymphocytes 2.4      Absolute Monocytes 0.5      Absolute Eosinophils 0.1      Absolute Basophils 0.1      Absolute Immature Granulocytes 0.0      Absolute NRBCs 0.0         RADIOLOGY:  Chest XR,  PA & LAT   Final Result   IMPRESSION: Heart size is normal. Lungs are clear. No pneumothorax or pleural effusion.          EKG:   Performed at: 01-JUN-2022 23:37  Impression: Sinus rhythm. Normal ECG  Rate: 78  Rhythm: Sinus rhythm  QRS Interval: 74  QTc Interval: 45  Comparison: When compared with EKG of 06-APR-2022, No significant change was found  I have independently reviewed and interpreted the EKG(s) documented above.         I, Isreal Castano, am serving as a scribe to document services personally performed by Dr. Nilay Stewart based on my observation and the provider's statements to me. I, Nilay Stewart M.D. attest that Isreal Castano is acting in a scribe capacity, has observed my performance of the services and has documented them in accordance with my direction.      Nilay Stewart M.D.  Emergency Medicine  [facility] EMERGENCY ROOM  [address] [facility] [id number] [phone]  Dept: [phone]     Nilay Stewart MD  06/04/22 3859

## 2022-06-29 ENCOUNTER — HOSPITAL ENCOUNTER (EMERGENCY)
Facility: CLINIC | Age: 37
Discharge: HOME | End: 2022-06-29
Attending: EMERGENCY MEDICINE | Admitting: EMERGENCY MEDICINE
Payer: COMMERCIAL

## 2022-06-29 ENCOUNTER — APPOINTMENT (OUTPATIENT)
Dept: CT IMAGING | Facility: CLINIC | Age: 37
End: 2022-06-29
Attending: EMERGENCY MEDICINE
Payer: COMMERCIAL

## 2022-06-29 ENCOUNTER — APPOINTMENT (OUTPATIENT)
Dept: MRI IMAGING | Facility: CLINIC | Age: 37
End: 2022-06-29
Attending: EMERGENCY MEDICINE
Payer: COMMERCIAL

## 2022-06-29 VITALS
HEIGHT: 61 IN | DIASTOLIC BLOOD PRESSURE: 92 MMHG | TEMPERATURE: 97.6 F | RESPIRATION RATE: 18 BRPM | HEART RATE: 80 BPM | BODY MASS INDEX: 22.66 KG/M2 | OXYGEN SATURATION: 100 % | WEIGHT: 120 LBS | SYSTOLIC BLOOD PRESSURE: 140 MMHG

## 2022-06-29 DIAGNOSIS — R29.90 STROKE-LIKE SYMPTOMS: ICD-10-CM

## 2022-06-29 LAB
ANION GAP SERPL CALCULATED.3IONS-SCNC: 9 MMOL/L (ref 5–18)
APTT PPP: 27 SECONDS (ref 22–38)
BASOPHILS # BLD AUTO: 0 10E3/UL (ref 0–0.2)
BASOPHILS NFR BLD AUTO: 0 %
BUN SERPL-MCNC: 13 MG/DL (ref 8–22)
CALCIUM SERPL-MCNC: 9.7 MG/DL (ref 8.5–10.5)
CHLORIDE BLD-SCNC: 105 MMOL/L (ref 98–107)
CO2 SERPL-SCNC: 25 MMOL/L (ref 22–31)
CREAT SERPL-MCNC: 0.76 MG/DL (ref 0.6–1.1)
EOSINOPHIL # BLD AUTO: 0.1 10E3/UL (ref 0–0.7)
EOSINOPHIL NFR BLD AUTO: 1 %
ERYTHROCYTE [DISTWIDTH] IN BLOOD BY AUTOMATED COUNT: 12.1 % (ref 10–15)
GFR SERPL CREATININE-BSD FRML MDRD: >90 ML/MIN/1.73M2
GLUCOSE BLD-MCNC: 148 MG/DL (ref 70–125)
GLUCOSE BLDC GLUCOMTR-MCNC: 152 MG/DL (ref 70–99)
HCT VFR BLD AUTO: 40.5 % (ref 35–47)
HGB BLD-MCNC: 13.5 G/DL (ref 11.7–15.7)
IMM GRANULOCYTES # BLD: 0.1 10E3/UL
IMM GRANULOCYTES NFR BLD: 0 %
INR PPP: 1.02 (ref 0.85–1.15)
LYMPHOCYTES # BLD AUTO: 2.2 10E3/UL (ref 0.8–5.3)
LYMPHOCYTES NFR BLD AUTO: 19 %
MCH RBC QN AUTO: 29.5 PG (ref 26.5–33)
MCHC RBC AUTO-ENTMCNC: 33.3 G/DL (ref 31.5–36.5)
MCV RBC AUTO: 89 FL (ref 78–100)
MONOCYTES # BLD AUTO: 0.5 10E3/UL (ref 0–1.3)
MONOCYTES NFR BLD AUTO: 4 %
NEUTROPHILS # BLD AUTO: 8.6 10E3/UL (ref 1.6–8.3)
NEUTROPHILS NFR BLD AUTO: 76 %
NRBC # BLD AUTO: 0 10E3/UL
NRBC BLD AUTO-RTO: 0 /100
PLATELET # BLD AUTO: 244 10E3/UL (ref 150–450)
POTASSIUM BLD-SCNC: 3.5 MMOL/L (ref 3.5–5)
RBC # BLD AUTO: 4.57 10E6/UL (ref 3.8–5.2)
SODIUM SERPL-SCNC: 139 MMOL/L (ref 136–145)
TROPONIN I SERPL-MCNC: <0.01 NG/ML (ref 0–0.29)
WBC # BLD AUTO: 11.4 10E3/UL (ref 4–11)

## 2022-06-29 PROCEDURE — 70496 CT ANGIOGRAPHY HEAD: CPT

## 2022-06-29 PROCEDURE — 255N000002 HC RX 255 OP 636: Performed by: EMERGENCY MEDICINE

## 2022-06-29 PROCEDURE — A9585 GADOBUTROL INJECTION: HCPCS | Performed by: EMERGENCY MEDICINE

## 2022-06-29 PROCEDURE — 93005 ELECTROCARDIOGRAM TRACING: CPT | Performed by: EMERGENCY MEDICINE

## 2022-06-29 PROCEDURE — 250N000011 HC RX IP 250 OP 636: Performed by: EMERGENCY MEDICINE

## 2022-06-29 PROCEDURE — 99285 EMERGENCY DEPT VISIT HI MDM: CPT | Mod: 25

## 2022-06-29 PROCEDURE — 36415 COLL VENOUS BLD VENIPUNCTURE: CPT | Performed by: EMERGENCY MEDICINE

## 2022-06-29 PROCEDURE — 96374 THER/PROPH/DIAG INJ IV PUSH: CPT | Mod: 59

## 2022-06-29 PROCEDURE — 84484 ASSAY OF TROPONIN QUANT: CPT | Performed by: EMERGENCY MEDICINE

## 2022-06-29 PROCEDURE — 70553 MRI BRAIN STEM W/O & W/DYE: CPT

## 2022-06-29 PROCEDURE — 85025 COMPLETE CBC W/AUTO DIFF WBC: CPT | Performed by: EMERGENCY MEDICINE

## 2022-06-29 PROCEDURE — 85730 THROMBOPLASTIN TIME PARTIAL: CPT | Performed by: EMERGENCY MEDICINE

## 2022-06-29 PROCEDURE — 82310 ASSAY OF CALCIUM: CPT | Performed by: EMERGENCY MEDICINE

## 2022-06-29 PROCEDURE — 85610 PROTHROMBIN TIME: CPT | Performed by: EMERGENCY MEDICINE

## 2022-06-29 RX ORDER — IOPAMIDOL 755 MG/ML
100 INJECTION, SOLUTION INTRAVASCULAR ONCE
Status: COMPLETED | OUTPATIENT
Start: 2022-06-29 | End: 2022-06-29

## 2022-06-29 RX ORDER — GADOBUTROL 604.72 MG/ML
5.5 INJECTION INTRAVENOUS ONCE
Status: COMPLETED | OUTPATIENT
Start: 2022-06-29 | End: 2022-06-29

## 2022-06-29 RX ORDER — LORAZEPAM 2 MG/ML
1 INJECTION INTRAMUSCULAR ONCE
Status: COMPLETED | OUTPATIENT
Start: 2022-06-29 | End: 2022-06-29

## 2022-06-29 RX ADMIN — GADOBUTROL 5.5 ML: 604.72 INJECTION INTRAVENOUS at 17:02

## 2022-06-29 RX ADMIN — IOPAMIDOL 75 ML: 755 INJECTION, SOLUTION INTRAVENOUS at 14:29

## 2022-06-29 RX ADMIN — LORAZEPAM 1 MG: 2 INJECTION INTRAMUSCULAR; INTRAVENOUS at 16:09

## 2022-06-29 NOTE — ED TRIAGE NOTES
States was at work and began feeling weak and dizzy approx 1200. Numbness to left side of face started shortly after wards and now extends down entire left side. No arm drift noticed. Endorses difference to touch to left side of face and arm. ED provider to triage and stroke code called     Triage Assessment     Row Name 06/29/22 1426       Triage Assessment (Adult)    Airway WDL WDL       Respiratory WDL    Respiratory WDL WDL       Skin Circulation/Temperature WDL    Skin Circulation/Temperature WDL WDL       Cardiac WDL    Cardiac WDL WDL       Peripheral/Neurovascular WDL    Peripheral Neurovascular WDL X  numbness to left side of body

## 2022-06-29 NOTE — ED NOTES
Stroke code was de-escalated at 2:57 pm per MD. Report given to primary RN, Tom. MRI checklist done and faxed to MRI. ETA for MRI 4pm.

## 2022-06-29 NOTE — CONSULTS
[facility]    Stroke Telephone Note    I was called by Flaquito Vu on 06/29/22 regarding patient Annette Dietz. The patient is a 36 year old female who had sudden onset of numbness of the left side of the body associated with mild headache at around noon. In the ED, neurological exam is only significant for left hemihypesthesia. Patient has history of right UE paresthesia several years ago at which time her brain MRI was negative. Patient has HTN and was just started on a low dose amlodipine yesterday.    Stroke Code Data (for stroke code without tele)  Stroke code activated 06/29/22   1419   Stroke provider first response  06/29/22   1422            Last known normal 06/29/22   1200        Time of discovery   (or onset of symptoms) 06/29/22   1200   Head CT read by Stroke Neuro Dr/Provider 06/29/22   1435   Was stroke code de-escalated? Yes 06/29/22 1450          Imaging Findings   Head CT: normal   CTA head and neck: normal    Intravenous Thrombolysis  Not given due to:   - minor/isolated/quickly resolving symptoms    Endovascular Treatment  Not initiated due to absence of proximal vessel occlusion    Impression  36F with HTN who presents after sudden onset of left sided numbness. neurological examination is only significant for left sided numbness. Patient also has a headache. DD include stroke and migraine. History of R UE sensory change years ago with negative MRI. Patient is not a good candidate for IV thrombolysis because of minimal symptoms. She is not a candidate for mechanical thrombectomy either because there is no LVO.       Recommendations   MRI brain with and without contrast. If MRI shows stroke, please call us back for further recommendations.     My recommendations are based on the information provided over the phone by Annette Dietz's in-person providers. They are not intended to replace the clinical judgment of her in-person providers. I was not requested to personally  "see or examine the patient at this time.        Michelle Hudson MD, Msc, ABUNDIO, FAAN   of Neurology  [facility]     06/29/2022 2:53 PM  To page me or covering stroke neurology team member, click here: AMCOM  Choose \"On Call\" tab at top, then search dropdown box for \"Neurology Adult\" & press Enter, look for Neuro ICU/Stroke    "

## 2022-06-29 NOTE — ED PROVIDER NOTES
EMERGENCY DEPARTMENT ENCOUNTER      NAME: Annette Dietz  AGE: 36 year old female  YOB: 1985  MRN: [id number]  EVALUATION DATE & TIME: 6/29/2022  2:20 PM    PCP: Rachael Fowler    ED PROVIDER: Flaquito Vu M.D.      Chief Complaint   Patient presents with     Stroke Symptoms         FINAL IMPRESSION:  Left-sided paresthesia      ED COURSE & MEDICAL DECISION MAKING:    Pertinent Labs & Imaging studies reviewed. (See chart for details)  36 year old female presents to the Emergency Department for evaluation of left facial, arm and leg numbness.  Symptoms started around noon with some tingling/numbness in the left side of her face.  Generalized soon thereafter involved to her arm and leg.  Patient denies similar episodes.  Seen in triage over concerns of stroke.  Neurologic exam nonfocal other than reported paresthesias to the right side of the body.  Given pure sensory symptoms stroke code was called but the likelihood of thrombolytics remote.. Patient appears non toxic with stable vitals signs.    2:16 PM I met with the patient for the initial interview and physical examination. Discussed plan for treatment and workup in the ED.    2:20 PM Stroke code called.  2:21 PM I spoke with Dr. Hudson, stroke neurology.  Given pure sensory symptomatology likely hi of not using tenecteplase very high.  2:32 PM Non-contrast CT negative per radiology.  2:47 PM I spoke with stroke neurology, Dr. Hudson. CTA negative. Order MRI w & w/o contrast  2:48 PM Stroke code was de-escalated.  2:49 PM I spoke with radiology, CTA negative  4:31 PM.  Laboratory evaluation unremarkable other than minimal elevation of glucose.  Patient awaiting MRI  5:29 PM.  MRI is unremarkable.  Patient with apparent recurrent paresthesias could be anxiety related.  Patient with prior similar presentation with right-sided symptomatology.  We will continue outpatient management.  No further interventions were  At the conclusion of the encounter I  discussed the results of all of the tests and the disposition. The questions were answered and return precautions provided. The patient or family acknowledged understanding and was agreeable with the care plan.       Patient represents a critical care situation.  Proximately 45 minutes spent direct involved in patient's care independent of any procedures     PPE: Provider wore gloves, N95 mask, eye protection, surgical cap.     MEDICATIONS GIVEN IN THE EMERGENCY:  Medications   iopamidol (ISOVUE-370) solution 100 mL (75 mLs Intravenous Given 6/29/22 0407)       NEW PRESCRIPTIONS STARTED AT TODAY'S ER VISIT  New Prescriptions    No medications on file          =================================================================    HPI    Patient information was obtained from: Patient    Use of Intrepreter: N/A       Annette Dietz is a 36 year old female with a pertient medical history of hypercholesterolemia who presents to the ED for evaluation of stroke symptoms.    Patient reports around noon today while she was working she had onset of left sided facial numbness, now with added left-sided arm and leg numbness. She notes associated lightheadedness when this started. She endorses she can feel touch to her left side, but it feels different than the right. Patient reports she recently started taking amlodipine, she also takes omeprazole and hydroxyzine. Patient denies any other current complaints.      REVIEW OF SYSTEMS   Constitutional:  Denies fever, chills  Respiratory:  Denies productive cough or increased work of breathing  Cardiovascular:  Denies chest pain, palpitations  GI:  Denies abdominal pain, nausea, vomiting, or change in bowel or bladder habits   Musculoskeletal:  Denies any new muscle/joint swelling  Skin:  Denies rash   Neurologic:  Denies focal weakness. Endorses focal numbness and lightheadedness.  All systems negative except as marked.     PAST MEDICAL HISTORY:  No past medical history on file.    PAST  "SURGICAL HISTORY:  Past Surgical History:   Procedure Laterality Date     WISDOM TOOTH EXTRACTION      no comp with [place]         CURRENT MEDICATIONS:    No current facility-administered medications for this encounter.    Current Outpatient Medications:      cholecalciferol, vitamin D3, 5,000 unit Tab, [CHOLECALCIFEROL, VITAMIN D3, 5,000 UNIT TAB] Take 5,000 Units by mouth daily., Disp: , Rfl:      hydrOXYzine (VISTARIL) 25 MG capsule, Take 1 capsule (25 mg) by mouth 3 times daily as needed for itching or anxiety, Disp: 12 capsule, Rfl: 0     MEDICATION CANNOT BE REORDERED - PLEASE MANUALLY REORDER AND DISCONTINUE THE OLD ORDER, [DOCOSHEXANOIC ACID-EICOSAPENT 500 MG (FISH OIL) 500-100 MG CAP CAPSULE] Take 1,000 mg by mouth daily., Disp: , Rfl:      PRENATAL VIT W-CA,FE,FA,<1 MG, (PRENATAL VITAMIN ORAL), [PRENATAL VIT W-CA,FE,FA,<1 MG, (PRENATAL VITAMIN ORAL)] Take 1 tablet by mouth daily. , Disp: , Rfl:      sucralfate (CARAFATE) 1 GM tablet, Take 1 tablet (1 g) by mouth 4 times daily as needed for nausea (chest pain), Disp: 30 tablet, Rfl: 0    ALLERGIES:  No Known Allergies    FAMILY HISTORY:  Family History   Problem Relation Age of Onset     Depression Mother      Hypertension Father      Hepatitis Brother      No Known Problems Sister      No Known Problems Daughter      No Known Problems Son      No Known Problems Paternal Grandmother      No Known Problems Sister        SOCIAL HISTORY:   Social History     Socioeconomic History     Marital status: Single   Tobacco Use     Smoking status: Never Smoker   Substance and Sexual Activity     Alcohol use: No     Drug use: No     Sexual activity: Yes     Partners: Male     Birth control/protection: None       VITALS:  Patient Vitals for the past 24 hrs:   BP Temp Temp src Pulse Resp SpO2 Height Weight   06/29/22 1419 (!) 152/87 97.6  F (36.4  C) Oral 93 20 97 % 1.549 m (5' 1\") 54.4 kg (120 lb)        PHYSICAL EXAM    Constitutional:  Awake, alert, in no apparent " distress  HENT:  Normocephalic, Atraumatic. Bilateral external ears normal. Oropharynx moist. Nose normal. Neck- Normal range of motion with no guarding, No midline cervical tenderness, Supple, No stridor.   Eyes:  PERRL, EOMI with no signs of entrapment, Conjunctiva normal, No discharge.   Respiratory:  Normal breath sounds, No respiratory distress, No wheezing.    Cardiovascular:  Normal heart rate, Normal rhythm, No appreciable rubs or gallops.   GI:  Soft, No tenderness, No distension, No palpable masses  Musculoskeletal:   No edema. Good range of motion in all major joints.   Integument:  Warm, Dry, No erythema, No rash.   Neurologic:  Alert & oriented, Normal motor function, Normal sensory function, No focal deficits noted. Negative ulnar drift. Negative finger nose finger. Normal exam other than decreased sensation to left side of face, upper and lower extremities.  Psychiatric:  Affect normal, Judgment normal, Mood normal.     LAB:  All pertinent labs reviewed and interpreted.  Results for orders placed or performed during the hospital encounter of 06/29/22   CTA Head Neck with Contrast     Status: None    Narrative    EXAM: CTA HEAD NECK W CONTRAST  LOCATION: [facility]  DATE/TIME: 6/29/2022 2:28 PM    INDICATION: left sided numbness  COMPARISON: Head and neck CT angiogram 04/06/2022, brain MRI 01/18/2016  CONTRAST: Isovue 370 75ml  TECHNIQUE: Head and neck CT angiogram with IV contrast. Noncontrast head CT followed by axial helical CT images of the head and neck vessels obtained during the arterial phase of intravenous contrast administration. Axial 2D reconstructed images and   multiplanar 3D MIP reconstructed images of the head and neck vessels were performed by the technologist. Dose reduction techniques were used. All stenosis measurements made according to NASCET criteria unless otherwise specified.    FINDINGS:   NONCONTRAST HEAD CT:   INTRACRANIAL CONTENTS: No intracranial  hemorrhage, extraaxial collection, or mass effect.  No CT evidence of acute infarct. Normal parenchymal attenuation. Normal ventricles and sulci.     VISUALIZED ORBITS/SINUSES/MASTOIDS: No intraorbital abnormality. No paranasal sinus mucosal disease. No middle ear or mastoid effusion.    BONES/SOFT TISSUES: No acute abnormality.    HEAD CTA:  ANTERIOR CIRCULATION: No stenosis/occlusion, aneurysm, or high flow vascular malformation. Standard [place] anatomy.    POSTERIOR CIRCULATION: No stenosis/occlusion, aneurysm, or high flow vascular malformation. Dominant left and smaller right vertebral artery contribute to a normal basilar artery.     DURAL VENOUS SINUSES: Expected enhancement of the major dural venous sinuses.    NECK CTA:  RIGHT CAROTID: No measurable stenosis or dissection.    LEFT CAROTID: No measurable stenosis or dissection.    VERTEBRAL ARTERIES: No focal stenosis or dissection. Dominant left and smaller right vertebral arteries.    AORTIC ARCH: Classic aortic arch anatomy with no significant stenosis at the origin of the great vessels.    NONVASCULAR STRUCTURES: Unremarkable.      Impression    IMPRESSION:   HEAD CT:  1.  No intracranial hemorrhage, mass lesions, hydrocephalus or CT evidence for an acute infarct.    Discussed the head CT findings with Dr. Vu at 2:33 PM, 06/29/2022.    HEAD CTA:   1.  Normal CTA [place].    NECK CTA:  1.  Normal neck CTA.      Discussed the CT angiogram findings with Dr. Vu at 2:48 PM, 06/29/2022.   MR Brain w/o & w Contrast     Status: None    Narrative    EXAM: MR BRAIN W/O and W CONTRAST  LOCATION: [facility]  DATE/TIME: 6/29/2022 4:26 PM    INDICATION: Left sided paresthesia  COMPARISON: CT brain 29 June 2022.  CONTRAST: 5.5ml Gadavist given  TECHNIQUE: Routine multiplanar multisequence head MRI without and with intravenous contrast.    FINDINGS:  INTRACRANIAL CONTENTS: No acute or subacute infarct. No mass, acute  hemorrhage, or extra-axial fluid collections. Normal brain parenchymal signal. Normal ventricles and sulci. Normal position of the cerebellar tonsils. No pathologic contrast enhancement.   Normal white matter tracts. Brainstem and cerebellum unremarkable.    SELLA: No abnormality accounting for technique.    OSSEOUS STRUCTURES/SOFT TISSUES: Normal marrow signal. The major intracranial vascular flow voids are maintained.     ORBITS: No abnormality accounting for technique.     SINUSES/MASTOIDS: No paranasal sinus mucosal disease. No middle ear or mastoid effusion.       Impression    IMPRESSION:  1.  Normal head MRI.   Basic metabolic panel     Status: Abnormal   Result Value Ref Range    Sodium 139 136 - 145 mmol/L    Potassium 3.5 3.5 - 5.0 mmol/L    Chloride 105 98 - 107 mmol/L    Carbon Dioxide (CO2) 25 22 - 31 mmol/L    Anion Gap 9 5 - 18 mmol/L    Urea Nitrogen 13 8 - 22 mg/dL    Creatinine 0.76 0.60 - 1.10 mg/dL    Calcium 9.7 8.5 - 10.5 mg/dL    Glucose 148 (H) 70 - 125 mg/dL    GFR Estimate >90 >60 mL/min/1.73m2   INR     Status: Normal   Result Value Ref Range    INR 1.02 0.85 - 1.15   Partial thromboplastin time     Status: Normal   Result Value Ref Range    aPTT 27 22 - 38 Seconds   Troponin I     Status: Normal   Result Value Ref Range    Troponin I <0.01 0.00 - 0.29 ng/mL   CBC with platelets and differential     Status: Abnormal   Result Value Ref Range    WBC Count 11.4 (H) 4.0 - 11.0 10e3/uL    RBC Count 4.57 3.80 - 5.20 10e6/uL    Hemoglobin 13.5 11.7 - 15.7 g/dL    Hematocrit 40.5 35.0 - 47.0 %    MCV 89 78 - 100 fL    MCH 29.5 26.5 - 33.0 pg    MCHC 33.3 31.5 - 36.5 g/dL    RDW 12.1 10.0 - 15.0 %    Platelet Count 244 150 - 450 10e3/uL    % Neutrophils 76 %    % Lymphocytes 19 %    % Monocytes 4 %    % Eosinophils 1 %    % Basophils 0 %    % Immature Granulocytes 0 %    NRBCs per 100 WBC 0 <1 /100    Absolute Neutrophils 8.6 (H) 1.6 - 8.3 10e3/uL    Absolute Lymphocytes 2.2 0.8 - 5.3 10e3/uL     Absolute Monocytes 0.5 0.0 - 1.3 10e3/uL    Absolute Eosinophils 0.1 0.0 - 0.7 10e3/uL    Absolute Basophils 0.0 0.0 - 0.2 10e3/uL    Absolute Immature Granulocytes 0.1 <=0.4 10e3/uL    Absolute NRBCs 0.0 10e3/uL   Glucose by meter     Status: Abnormal   Result Value Ref Range    GLUCOSE BY METER POCT 152 (H) 70 - 99 mg/dL   CBC with Platelets & Differential     Status: Abnormal    Narrative    The following orders were created for panel order CBC with Platelets & Differential.  Procedure                               Abnormality         Status                     ---------                               -----------         ------                     CBC with platelets and d...[[id number]]  Abnormal            Final result                 Please view results for these tests on the individual orders.       RADIOLOGY:  Reviewed all pertinent imaging. Please see official radiology report.  CTA Head Neck with Contrast    (Results Pending)   MR Brain w/o & w Contrast    (Results Pending)       EKG:    Normal sinus rhythm.  Rate of 87.  Normal QRS.  Normal ST segments.  Normal EKG.  When compared to June 1, 2022 essentially unchanged  I have independently reviewed and interpreted the EKG(s) documented above.    PROCEDURES:   [employer] Stroke Scale  Exam Interval:    Score    Level of consciousness: 0    LOC questions: 0    LOC commands: 0    Best gaze: 0    Visual: 0    Facial palsy: 0    Motor arm (left): 0    Motor arm (right): 0    Motor leg (left): 0    Motor leg (right): 0    Limb ataxia: 0    Sensory: 1    Best language: 0    Dysarthria: 0    Extinction and inattention: 0    0    Total Score: 1          I, Haydee Prasad, am serving as a scribe to document services personally performed by Flaquito Vu MD, based on my observation and the provider's statements to me. I, Flaquito Vu MD attest that Haydee Prasad is acting in a scribe capacity, has observed my performance of the services and has documented  them in accordance with my direction.    Flaquito Vu M.D.  Emergency Medicine  [facility] EMERGENCY ROOM     Flaquito Vu MD  06/29/22 1243

## 2022-06-30 LAB
ATRIAL RATE - MUSE: 87 BPM
DIASTOLIC BLOOD PRESSURE - MUSE: NORMAL MMHG
INTERPRETATION ECG - MUSE: NORMAL
P AXIS - MUSE: 72 DEGREES
PR INTERVAL - MUSE: 126 MS
QRS DURATION - MUSE: 80 MS
QT - MUSE: 364 MS
QTC - MUSE: 438 MS
R AXIS - MUSE: 40 DEGREES
SYSTOLIC BLOOD PRESSURE - MUSE: NORMAL MMHG
T AXIS - MUSE: 48 DEGREES
VENTRICULAR RATE- MUSE: 87 BPM

## 2022-08-14 ENCOUNTER — HEALTH MAINTENANCE LETTER (OUTPATIENT)
Age: 37
End: 2022-08-14

## 2022-11-19 ENCOUNTER — HEALTH MAINTENANCE LETTER (OUTPATIENT)
Age: 37
End: 2022-11-19

## 2022-12-10 ENCOUNTER — HOSPITAL ENCOUNTER (EMERGENCY)
Facility: CLINIC | Age: 37
Discharge: HOME | End: 2022-12-10
Admitting: PHYSICIAN ASSISTANT
Payer: COMMERCIAL

## 2022-12-10 VITALS
WEIGHT: 120 LBS | HEIGHT: 61 IN | DIASTOLIC BLOOD PRESSURE: 82 MMHG | HEART RATE: 73 BPM | RESPIRATION RATE: 16 BRPM | TEMPERATURE: 98 F | SYSTOLIC BLOOD PRESSURE: 156 MMHG | OXYGEN SATURATION: 99 % | BODY MASS INDEX: 22.66 KG/M2

## 2022-12-10 DIAGNOSIS — G43.809 OTHER MIGRAINE WITHOUT STATUS MIGRAINOSUS, NOT INTRACTABLE: ICD-10-CM

## 2022-12-10 LAB
ANION GAP SERPL CALCULATED.3IONS-SCNC: 10 MMOL/L (ref 5–18)
ATRIAL RATE - MUSE: 93 BPM
BASOPHILS # BLD AUTO: 0 10E3/UL (ref 0–0.2)
BASOPHILS NFR BLD AUTO: 1 %
BUN SERPL-MCNC: 10 MG/DL (ref 8–22)
CALCIUM SERPL-MCNC: 9.2 MG/DL (ref 8.5–10.5)
CHLORIDE BLD-SCNC: 107 MMOL/L (ref 98–107)
CO2 SERPL-SCNC: 23 MMOL/L (ref 22–31)
CREAT SERPL-MCNC: 0.74 MG/DL (ref 0.6–1.1)
DIASTOLIC BLOOD PRESSURE - MUSE: NORMAL MMHG
EOSINOPHIL # BLD AUTO: 0.1 10E3/UL (ref 0–0.7)
EOSINOPHIL NFR BLD AUTO: 1 %
ERYTHROCYTE [DISTWIDTH] IN BLOOD BY AUTOMATED COUNT: 12.1 % (ref 10–15)
GFR SERPL CREATININE-BSD FRML MDRD: >90 ML/MIN/1.73M2
GLUCOSE BLD-MCNC: 90 MG/DL (ref 70–125)
HCG SERPL QL: NEGATIVE
HCT VFR BLD AUTO: 36.8 % (ref 35–47)
HGB BLD-MCNC: 12.2 G/DL (ref 11.7–15.7)
IMM GRANULOCYTES # BLD: 0 10E3/UL
IMM GRANULOCYTES NFR BLD: 1 %
INTERPRETATION ECG - MUSE: NORMAL
LYMPHOCYTES # BLD AUTO: 0.8 10E3/UL (ref 0.8–5.3)
LYMPHOCYTES NFR BLD AUTO: 13 %
MAGNESIUM SERPL-MCNC: 2.2 MG/DL (ref 1.8–2.6)
MCH RBC QN AUTO: 29.4 PG (ref 26.5–33)
MCHC RBC AUTO-ENTMCNC: 33.2 G/DL (ref 31.5–36.5)
MCV RBC AUTO: 89 FL (ref 78–100)
MONOCYTES # BLD AUTO: 0.3 10E3/UL (ref 0–1.3)
MONOCYTES NFR BLD AUTO: 5 %
NEUTROPHILS # BLD AUTO: 5.2 10E3/UL (ref 1.6–8.3)
NEUTROPHILS NFR BLD AUTO: 79 %
NRBC # BLD AUTO: 0 10E3/UL
NRBC BLD AUTO-RTO: 0 /100
P AXIS - MUSE: 70 DEGREES
PLATELET # BLD AUTO: 229 10E3/UL (ref 150–450)
POTASSIUM BLD-SCNC: 3.8 MMOL/L (ref 3.5–5)
PR INTERVAL - MUSE: 124 MS
QRS DURATION - MUSE: 80 MS
QT - MUSE: 350 MS
QTC - MUSE: 435 MS
R AXIS - MUSE: 16 DEGREES
RBC # BLD AUTO: 4.15 10E6/UL (ref 3.8–5.2)
SODIUM SERPL-SCNC: 140 MMOL/L (ref 136–145)
SYSTOLIC BLOOD PRESSURE - MUSE: NORMAL MMHG
T AXIS - MUSE: 31 DEGREES
TSH SERPL DL<=0.005 MIU/L-ACNC: 1.47 UIU/ML (ref 0.3–5)
VENTRICULAR RATE- MUSE: 93 BPM
WBC # BLD AUTO: 6.4 10E3/UL (ref 4–11)

## 2022-12-10 PROCEDURE — 83735 ASSAY OF MAGNESIUM: CPT | Performed by: PHYSICIAN ASSISTANT

## 2022-12-10 PROCEDURE — 93005 ELECTROCARDIOGRAM TRACING: CPT | Performed by: EMERGENCY MEDICINE

## 2022-12-10 PROCEDURE — 96375 TX/PRO/DX INJ NEW DRUG ADDON: CPT

## 2022-12-10 PROCEDURE — 96374 THER/PROPH/DIAG INJ IV PUSH: CPT

## 2022-12-10 PROCEDURE — 96361 HYDRATE IV INFUSION ADD-ON: CPT

## 2022-12-10 PROCEDURE — 80048 BASIC METABOLIC PNL TOTAL CA: CPT | Performed by: PHYSICIAN ASSISTANT

## 2022-12-10 PROCEDURE — 84703 CHORIONIC GONADOTROPIN ASSAY: CPT | Performed by: PHYSICIAN ASSISTANT

## 2022-12-10 PROCEDURE — 250N000011 HC RX IP 250 OP 636: Performed by: PHYSICIAN ASSISTANT

## 2022-12-10 PROCEDURE — 36415 COLL VENOUS BLD VENIPUNCTURE: CPT | Performed by: PHYSICIAN ASSISTANT

## 2022-12-10 PROCEDURE — 99284 EMERGENCY DEPT VISIT MOD MDM: CPT | Mod: 25

## 2022-12-10 PROCEDURE — 84443 ASSAY THYROID STIM HORMONE: CPT | Performed by: PHYSICIAN ASSISTANT

## 2022-12-10 PROCEDURE — 258N000003 HC RX IP 258 OP 636: Performed by: PHYSICIAN ASSISTANT

## 2022-12-10 PROCEDURE — 85025 COMPLETE CBC W/AUTO DIFF WBC: CPT | Performed by: PHYSICIAN ASSISTANT

## 2022-12-10 RX ORDER — DIPHENHYDRAMINE HYDROCHLORIDE 50 MG/ML
25 INJECTION INTRAMUSCULAR; INTRAVENOUS ONCE
Status: COMPLETED | OUTPATIENT
Start: 2022-12-10 | End: 2022-12-10

## 2022-12-10 RX ORDER — KETOROLAC TROMETHAMINE 15 MG/ML
15 INJECTION, SOLUTION INTRAMUSCULAR; INTRAVENOUS ONCE
Status: COMPLETED | OUTPATIENT
Start: 2022-12-10 | End: 2022-12-10

## 2022-12-10 RX ORDER — METOCLOPRAMIDE HYDROCHLORIDE 5 MG/ML
5 INJECTION INTRAMUSCULAR; INTRAVENOUS ONCE
Status: COMPLETED | OUTPATIENT
Start: 2022-12-10 | End: 2022-12-10

## 2022-12-10 RX ADMIN — METOCLOPRAMIDE HYDROCHLORIDE 5 MG: 5 INJECTION INTRAMUSCULAR; INTRAVENOUS at 13:06

## 2022-12-10 RX ADMIN — DIPHENHYDRAMINE HYDROCHLORIDE 25 MG: 50 INJECTION, SOLUTION INTRAMUSCULAR; INTRAVENOUS at 13:07

## 2022-12-10 RX ADMIN — SODIUM CHLORIDE 1000 ML: 9 INJECTION, SOLUTION INTRAVENOUS at 13:10

## 2022-12-10 ASSESSMENT — ENCOUNTER SYMPTOMS
NERVOUS/ANXIOUS: 1
MUSCULOSKELETAL NEGATIVE: 1
CHILLS: 0
CHEST TIGHTNESS: 0
GASTROINTESTINAL NEGATIVE: 1
CONFUSION: 0
FATIGUE: 1
PALPITATIONS: 1
SHORTNESS OF BREATH: 0
COUGH: 0
FEVER: 0
HEADACHES: 1

## 2022-12-10 NOTE — DISCHARGE INSTRUCTIONS
Based on your improvement of symptoms I do believe that this was all related to a migraine headache.  Your screening blood work has returned unremarkable, however, your thyroid screening tests as well as your pregnancy test had not returned prior to your discharge.  You certainly can follow-up on this through [facility], however at this point time since you are ready to leave I did not feel that these labs would be necessary to keep you here your electrolytes and hemoglobin appear normal and stable.  Certainly if there are new or worsening symptoms you can consider returning to the ED otherwise outpatient follow-up through primary care is appropriate.

## 2022-12-10 NOTE — ED PROVIDER NOTES
EMERGENCY DEPARTMENT ENCOUNTER      NAME: Annette Dietz  AGE: 37 year old female  YOB: 1985  MRN: [id number]  EVALUATION DATE & TIME: No admission date for patient encounter.    PCP: Rachael Fowler    ED PROVIDER: Andrea Grey PA-C      Chief Complaint   Patient presents with     Headache     Palpitations         FINAL IMPRESSION:  1. Other migraine without status migrainosus, not intractable          MEDICAL DECISION MAKING:    Pertinent Labs & Imaging studies reviewed. (See chart for details)  37 year old female presents to the Emergency Department for evaluation of left-sided headache.    After obtaining history present illness, reviewing vital signs and examining the patient plan to screen with blood work and treat for migraine with fluids, Reglan, Benadryl, Toradol.    Low clinical suspicion for encephalitis or meningitis based on no fever no confusion no neck stiffness.  Seems unlikely to represent subarachnoid hemorrhage.  After reviewing previous medical records patient has had presentations for paresthesias, I suspect that this is likely some type of migrainous process.  If there is no significant improvement in symptoms will consider CT scan imaging if needed but at this point time my suspicion for the need for acute imaging is fairly low.    Patient has been feeling feeling significant improvement.  She is prepared to discharge to home.  I did inform her that there are couple labs that are still pending however patient does not want to wait and I did not feel that waiting for the thyroid test or the pregnancy test was emergently necessary.  This can be followed as an outpatient.  My suspicion for either of these being abnormal is quite low.  Overall patient agreeable.    Clinical suspicion at this time based on improvement was migraine headache as a cause of her symptoms.    Medical Decision Making    History:    Supplemental history from: Family Member/Significant Other    External  Record(s) reviewed: Outpatient Record: Previous ED visits as well as review of CT scans and MRI results within the last 1 to 2 years.    Work Up:    Chart documentation includes differential considered and any EKGs or imaging interpreted by provider.    In additional to work up documented, I considered the following work up: Imaging CT, but deferred due to Previous history of similar presentations, no new trauma, low suspicion for mass or hemorrhage..    External consultation:    Discussion of management with another provider: See chart documentation, if applicable    Complicating factors:    Care impacted by chronic illness: None    Care affected by social determinants of health: N/A    Disposition considerations: Discharge. No recommendations on prescription strength medication(s). N/A.             ED COURSE    I met with the patient, obtained history, performed an initial exam, and discussed options and plan for diagnostics and treatment here in the ED.    At the conclusion of the encounter I discussed the results of all of the tests and the disposition. The questions were answered. The patient or family acknowledged understanding and was agreeable with the care plan.     MEDICATIONS GIVEN IN THE EMERGENCY:  Medications   0.9% sodium chloride BOLUS (0 mLs Intravenous Stopped 12/10/22 1403)   ketorolac (TORADOL) injection 15 mg (15 mg Intravenous Not Given 12/10/22 1402)   metoclopramide (REGLAN) injection 5 mg (5 mg Intravenous Given 12/10/22 1306)   diphenhydrAMINE (BENADRYL) injection 25 mg (25 mg Intravenous Given 12/10/22 1307)       NEW PRESCRIPTIONS STARTED AT TODAY'S ER VISIT  New Prescriptions    No medications on file            =================================================================    HPI    Patient information was obtained from: Patient and review of previous medical records  Annette Dietz is a 37 year old female with a pertinent history of paresthesias episodes that have been worked up with CT  "scans and MRIs who presents to this ED for evaluation of left-sided headache.  Patient admits that over the last day or 2 she has been feeling \"off\" she has felt some want fatigued, shaky, feeling palpitations.  This morning 2 to 3 hours ago she reported sensation of left-sided headache.  No preceding blurred vision.  She denies any photophobia.  Denies any recent nausea vomiting or diarrhea not no acute nausea or vomiting.  She did not take any medications prior to arrival.  She denies any acute chest pain or shortness of breath but does admit that from time to time she feels palpitations.  No complaints of abdominal pain.  She denies any focal weakness in her arms or legs but does describe paresthesias that come and go in her hands.  Patient's  at the bedside.      REVIEW OF SYSTEMS   Review of Systems   Constitutional: Positive for fatigue. Negative for chills and fever.   HENT: Negative.    Respiratory: Negative for cough, chest tightness and shortness of breath.    Cardiovascular: Positive for palpitations. Negative for chest pain.   Gastrointestinal: Negative.    Genitourinary: Negative.    Musculoskeletal: Negative.    Skin: Negative.    Neurological: Positive for headaches.   Psychiatric/Behavioral: Negative for confusion. The patient is nervous/anxious.    All other systems reviewed and are negative.         PAST MEDICAL HISTORY:  No past medical history on file.    PAST SURGICAL HISTORY:  Past Surgical History:   Procedure Laterality Date     WISDOM TOOTH EXTRACTION      no comp with [place]         CURRENT MEDICATIONS:    No current facility-administered medications for this encounter.    Current Outpatient Medications:      cholecalciferol, vitamin D3, 5,000 unit Tab, [CHOLECALCIFEROL, VITAMIN D3, 5,000 UNIT TAB] Take 5,000 Units by mouth daily., Disp: , Rfl:      hydrOXYzine (VISTARIL) 25 MG capsule, Take 1 capsule (25 mg) by mouth 3 times daily as needed for itching or anxiety, Disp: 12 capsule, Rfl: " "0     MEDICATION CANNOT BE REORDERED - PLEASE MANUALLY REORDER AND DISCONTINUE THE OLD ORDER, [DOCOSHEXANOIC ACID-EICOSAPENT 500 MG (FISH OIL) 500-100 MG CAP CAPSULE] Take 1,000 mg by mouth daily., Disp: , Rfl:      PRENATAL VIT W-CA,FE,FA,<1 MG, (PRENATAL VITAMIN ORAL), [PRENATAL VIT W-CA,FE,FA,<1 MG, (PRENATAL VITAMIN ORAL)] Take 1 tablet by mouth daily. , Disp: , Rfl:      sucralfate (CARAFATE) 1 GM tablet, Take 1 tablet (1 g) by mouth 4 times daily as needed for nausea (chest pain), Disp: 30 tablet, Rfl: 0      ALLERGIES:  No Known Allergies    FAMILY HISTORY:  Family History   Problem Relation Age of Onset     Depression Mother      Hypertension Father      Hepatitis Brother      No Known Problems Sister      No Known Problems Daughter      No Known Problems Son      No Known Problems Paternal Grandmother      No Known Problems Sister        SOCIAL HISTORY:   Social History     Socioeconomic History     Marital status: Single   Tobacco Use     Smoking status: Never   Substance and Sexual Activity     Alcohol use: No     Drug use: No     Sexual activity: Yes     Partners: Male     Birth control/protection: None       VITALS:  Patient Vitals for the past 24 hrs:   BP Temp Temp src Pulse Resp SpO2 Height Weight   12/10/22 1403 (!) 156/82 -- -- 73 16 99 % -- --   12/10/22 1137 (!) 167/95 98  F (36.7  C) Temporal 94 18 -- 1.549 m (5' 1\") 54.4 kg (120 lb)       PHYSICAL EXAM    Physical Exam  Constitutional:       General: She is not in acute distress.     Appearance: Normal appearance. She is normal weight. She is not ill-appearing, toxic-appearing or diaphoretic.   HENT:      Head: Normocephalic and atraumatic.      Right Ear: External ear normal.   Eyes:      Conjunctiva/sclera: Conjunctivae normal.      Pupils: Pupils are equal, round, and reactive to light.   Cardiovascular:      Rate and Rhythm: Normal rate.      Pulses: Normal pulses.      Heart sounds: No murmur heard.  Pulmonary:      Effort: Pulmonary " effort is normal. No respiratory distress.      Breath sounds: No wheezing or rales.   Musculoskeletal:         General: No tenderness, deformity or signs of injury. Normal range of motion.      Cervical back: Normal range of motion. No tenderness.   Skin:     General: Skin is warm and dry.      Capillary Refill: Capillary refill takes less than 2 seconds.      Findings: No rash.   Neurological:      General: No focal deficit present.      Mental Status: She is alert and oriented to person, place, and time. Mental status is at baseline.      Cranial Nerves: No cranial nerve deficit.      Motor: No weakness.      Gait: Gait normal.   Psychiatric:      Comments: Patient does seem anxious          LAB:  All pertinent labs reviewed and interpreted.  Results for orders placed or performed during the hospital encounter of 12/10/22   Result Value Ref Range    Magnesium 2.2 1.8 - 2.6 mg/dL   Basic metabolic panel   Result Value Ref Range    Sodium 140 136 - 145 mmol/L    Potassium 3.8 3.5 - 5.0 mmol/L    Chloride 107 98 - 107 mmol/L    Carbon Dioxide (CO2) 23 22 - 31 mmol/L    Anion Gap 10 5 - 18 mmol/L    Urea Nitrogen 10 8 - 22 mg/dL    Creatinine 0.74 0.60 - 1.10 mg/dL    Calcium 9.2 8.5 - 10.5 mg/dL    Glucose 90 70 - 125 mg/dL    GFR Estimate >90 >60 mL/min/1.73m2   CBC with platelets and differential   Result Value Ref Range    WBC Count 6.4 4.0 - 11.0 10e3/uL    RBC Count 4.15 3.80 - 5.20 10e6/uL    Hemoglobin 12.2 11.7 - 15.7 g/dL    Hematocrit 36.8 35.0 - 47.0 %    MCV 89 78 - 100 fL    MCH 29.4 26.5 - 33.0 pg    MCHC 33.2 31.5 - 36.5 g/dL    RDW 12.1 10.0 - 15.0 %    Platelet Count 229 150 - 450 10e3/uL    % Neutrophils 79 %    % Lymphocytes 13 %    % Monocytes 5 %    % Eosinophils 1 %    % Basophils 1 %    % Immature Granulocytes 1 %    NRBCs per 100 WBC 0 <1 /100    Absolute Neutrophils 5.2 1.6 - 8.3 10e3/uL    Absolute Lymphocytes 0.8 0.8 - 5.3 10e3/uL    Absolute Monocytes 0.3 0.0 - 1.3 10e3/uL    Absolute  Eosinophils 0.1 0.0 - 0.7 10e3/uL    Absolute Basophils 0.0 0.0 - 0.2 10e3/uL    Absolute Immature Granulocytes 0.0 <=0.4 10e3/uL    Absolute NRBCs 0.0 10e3/uL       RADIOLOGY:  Reviewed all pertinent imaging. Please see official radiology report.  No orders to display       EKG: Performed at 11:41 AM    The EKG showing a sinus rhythm at a rate of 93 bpm.  ST segments are normal with no acute elevation or depression.  T waves are upright.  QTC measured at 435.  Compared to previous EKG from June 2022, no change.  Andrea Grey PA-C  Emergency Medicine  [facility]     Andrea Grey PA-C  12/10/22 9554

## 2022-12-10 NOTE — ED TRIAGE NOTES
"2 day history of a left sided headache.  Pt states she is feeling her heart skipping beats and chest \"pounding\".  Bilateral hand numbness which is now resolving.  Has not taken meds for her headache.     Triage Assessment     Row Name 12/10/22 1138       Triage Assessment (Adult)    Airway WDL WDL       Respiratory WDL    Respiratory WDL WDL       Skin Circulation/Temperature WDL    Skin Circulation/Temperature WDL WDL       Cardiac WDL    Cardiac WDL WDL       Peripheral/Neurovascular WDL    Peripheral Neurovascular WDL WDL       Cognitive/Neuro/Behavioral WDL    Cognitive/Neuro/Behavioral WDL WDL              "

## 2023-09-29 ENCOUNTER — HOSPITAL ENCOUNTER (EMERGENCY)
Facility: CLINIC | Age: 38
Discharge: HOME | End: 2023-09-29
Attending: EMERGENCY MEDICINE | Admitting: EMERGENCY MEDICINE
Payer: COMMERCIAL

## 2023-09-29 ENCOUNTER — APPOINTMENT (OUTPATIENT)
Dept: CT IMAGING | Facility: CLINIC | Age: 38
End: 2023-09-29
Payer: COMMERCIAL

## 2023-09-29 VITALS
OXYGEN SATURATION: 98 % | WEIGHT: 125 LBS | SYSTOLIC BLOOD PRESSURE: 118 MMHG | BODY MASS INDEX: 23 KG/M2 | HEIGHT: 62 IN | TEMPERATURE: 98.7 F | HEART RATE: 77 BPM | DIASTOLIC BLOOD PRESSURE: 70 MMHG | RESPIRATION RATE: 16 BRPM

## 2023-09-29 DIAGNOSIS — F41.0 GENERALIZED ANXIETY DISORDER WITH PANIC ATTACKS: ICD-10-CM

## 2023-09-29 DIAGNOSIS — R42 DIZZINESS: ICD-10-CM

## 2023-09-29 DIAGNOSIS — R51.9 HEADACHE, UNSPECIFIED HEADACHE TYPE: ICD-10-CM

## 2023-09-29 DIAGNOSIS — F41.1 GENERALIZED ANXIETY DISORDER WITH PANIC ATTACKS: ICD-10-CM

## 2023-09-29 PROBLEM — E78.00 PURE HYPERCHOLESTEROLEMIA: Status: ACTIVE | Noted: 2021-10-16

## 2023-09-29 PROBLEM — H17.9 CORNEAL SCARS, BOTH EYES: Status: ACTIVE | Noted: 2018-12-07

## 2023-09-29 PROBLEM — Z86.32 HX GESTATIONAL DIABETES: Status: ACTIVE | Noted: 2018-08-08

## 2023-09-29 LAB
ALBUMIN UR-MCNC: NEGATIVE MG/DL
ANION GAP SERPL CALCULATED.3IONS-SCNC: 14 MMOL/L (ref 7–15)
APPEARANCE UR: CLEAR
BILIRUB UR QL STRIP: NEGATIVE
BUN SERPL-MCNC: 16.3 MG/DL (ref 6–20)
CALCIUM SERPL-MCNC: 9.8 MG/DL (ref 8.6–10)
CHLORIDE SERPL-SCNC: 103 MMOL/L (ref 98–107)
COLOR UR AUTO: ABNORMAL
CREAT SERPL-MCNC: 0.8 MG/DL (ref 0.51–0.95)
D DIMER PPP FEU-MCNC: 0.93 UG/ML FEU (ref 0–0.5)
DEPRECATED HCO3 PLAS-SCNC: 21 MMOL/L (ref 22–29)
EGFRCR SERPLBLD CKD-EPI 2021: >90 ML/MIN/1.73M2
ERYTHROCYTE [DISTWIDTH] IN BLOOD BY AUTOMATED COUNT: 11.9 % (ref 10–15)
FLUAV RNA SPEC QL NAA+PROBE: NEGATIVE
FLUBV RNA RESP QL NAA+PROBE: NEGATIVE
GLUCOSE SERPL-MCNC: 120 MG/DL (ref 70–99)
GLUCOSE UR STRIP-MCNC: NEGATIVE MG/DL
HCG UR QL: NEGATIVE
HCT VFR BLD AUTO: 41.7 % (ref 35–47)
HGB BLD-MCNC: 14.4 G/DL (ref 11.7–15.7)
HGB UR QL STRIP: NEGATIVE
KETONES UR STRIP-MCNC: NEGATIVE MG/DL
LEUKOCYTE ESTERASE UR QL STRIP: NEGATIVE
MAGNESIUM SERPL-MCNC: 2 MG/DL (ref 1.7–2.3)
MCH RBC QN AUTO: 30.1 PG (ref 26.5–33)
MCHC RBC AUTO-ENTMCNC: 34.5 G/DL (ref 31.5–36.5)
MCV RBC AUTO: 87 FL (ref 78–100)
MONOCYTES NFR BLD AUTO: NEGATIVE %
MUCOUS THREADS #/AREA URNS LPF: PRESENT /LPF
NITRATE UR QL: NEGATIVE
PH UR STRIP: 6.5 [PH] (ref 5–7)
PLATELET # BLD AUTO: 240 10E3/UL (ref 150–450)
POTASSIUM SERPL-SCNC: 3.7 MMOL/L (ref 3.4–5.3)
RADIOLOGIST FLAGS: NORMAL
RBC # BLD AUTO: 4.78 10E6/UL (ref 3.8–5.2)
RBC URINE: <1 /HPF
RSV RNA SPEC NAA+PROBE: NEGATIVE
SARS-COV-2 RNA RESP QL NAA+PROBE: NEGATIVE
SODIUM SERPL-SCNC: 138 MMOL/L (ref 135–145)
SP GR UR STRIP: 1.01 (ref 1–1.03)
SQUAMOUS EPITHELIAL: 1 /HPF
TROPONIN T SERPL HS-MCNC: <6 NG/L
UROBILINOGEN UR STRIP-MCNC: <2 MG/DL
WBC # BLD AUTO: 7.2 10E3/UL (ref 4–11)
WBC URINE: 0 /HPF

## 2023-09-29 PROCEDURE — 70498 CT ANGIOGRAPHY NECK: CPT

## 2023-09-29 PROCEDURE — 93005 ELECTROCARDIOGRAM TRACING: CPT | Performed by: EMERGENCY MEDICINE

## 2023-09-29 PROCEDURE — 85027 COMPLETE CBC AUTOMATED: CPT

## 2023-09-29 PROCEDURE — 258N000003 HC RX IP 258 OP 636

## 2023-09-29 PROCEDURE — 83735 ASSAY OF MAGNESIUM: CPT

## 2023-09-29 PROCEDURE — 87637 SARSCOV2&INF A&B&RSV AMP PRB: CPT

## 2023-09-29 PROCEDURE — 250N000011 HC RX IP 250 OP 636: Mod: JZ

## 2023-09-29 PROCEDURE — 99285 EMERGENCY DEPT VISIT HI MDM: CPT | Mod: 25

## 2023-09-29 PROCEDURE — 96375 TX/PRO/DX INJ NEW DRUG ADDON: CPT | Mod: 59

## 2023-09-29 PROCEDURE — 70496 CT ANGIOGRAPHY HEAD: CPT

## 2023-09-29 PROCEDURE — 81025 URINE PREGNANCY TEST: CPT

## 2023-09-29 PROCEDURE — 96361 HYDRATE IV INFUSION ADD-ON: CPT

## 2023-09-29 PROCEDURE — 96374 THER/PROPH/DIAG INJ IV PUSH: CPT | Mod: 59

## 2023-09-29 PROCEDURE — 81003 URINALYSIS AUTO W/O SCOPE: CPT

## 2023-09-29 PROCEDURE — 84484 ASSAY OF TROPONIN QUANT: CPT

## 2023-09-29 PROCEDURE — 36415 COLL VENOUS BLD VENIPUNCTURE: CPT

## 2023-09-29 PROCEDURE — 74177 CT ABD & PELVIS W/CONTRAST: CPT

## 2023-09-29 PROCEDURE — 80048 BASIC METABOLIC PNL TOTAL CA: CPT

## 2023-09-29 PROCEDURE — 85379 FIBRIN DEGRADATION QUANT: CPT

## 2023-09-29 PROCEDURE — 86308 HETEROPHILE ANTIBODY SCREEN: CPT

## 2023-09-29 RX ORDER — LORAZEPAM 2 MG/ML
1 INJECTION INTRAMUSCULAR ONCE
Status: COMPLETED | OUTPATIENT
Start: 2023-09-29 | End: 2023-09-29

## 2023-09-29 RX ORDER — IOPAMIDOL 755 MG/ML
75 INJECTION, SOLUTION INTRAVASCULAR ONCE
Status: COMPLETED | OUTPATIENT
Start: 2023-09-29 | End: 2023-09-29

## 2023-09-29 RX ORDER — DEXAMETHASONE SODIUM PHOSPHATE 10 MG/ML
10 INJECTION, SOLUTION INTRAMUSCULAR; INTRAVENOUS ONCE
Status: COMPLETED | OUTPATIENT
Start: 2023-09-29 | End: 2023-09-29

## 2023-09-29 RX ORDER — DIPHENHYDRAMINE HYDROCHLORIDE 50 MG/ML
25 INJECTION INTRAMUSCULAR; INTRAVENOUS ONCE
Status: COMPLETED | OUTPATIENT
Start: 2023-09-29 | End: 2023-09-29

## 2023-09-29 RX ORDER — HYDROXYZINE HYDROCHLORIDE 25 MG/1
25 TABLET, FILM COATED ORAL 3 TIMES DAILY PRN
Qty: 20 TABLET | Refills: 0 | Status: SHIPPED | OUTPATIENT
Start: 2023-09-29

## 2023-09-29 RX ORDER — METOCLOPRAMIDE HYDROCHLORIDE 5 MG/ML
10 INJECTION INTRAMUSCULAR; INTRAVENOUS ONCE
Status: COMPLETED | OUTPATIENT
Start: 2023-09-29 | End: 2023-09-29

## 2023-09-29 RX ADMIN — IOPAMIDOL 75 ML: 755 INJECTION, SOLUTION INTRAVENOUS at 19:39

## 2023-09-29 RX ADMIN — LORAZEPAM 1 MG: 2 INJECTION INTRAMUSCULAR; INTRAVENOUS at 18:58

## 2023-09-29 RX ADMIN — IOPAMIDOL 75 ML: 755 INJECTION, SOLUTION INTRAVENOUS at 19:48

## 2023-09-29 RX ADMIN — DIPHENHYDRAMINE HYDROCHLORIDE 25 MG: 50 INJECTION, SOLUTION INTRAMUSCULAR; INTRAVENOUS at 18:59

## 2023-09-29 RX ADMIN — DEXAMETHASONE SODIUM PHOSPHATE 10 MG: 10 INJECTION, SOLUTION INTRAMUSCULAR; INTRAVENOUS at 19:02

## 2023-09-29 RX ADMIN — SODIUM CHLORIDE 1000 ML: 9 INJECTION, SOLUTION INTRAVENOUS at 18:27

## 2023-09-29 RX ADMIN — METOCLOPRAMIDE HYDROCHLORIDE 10 MG: 5 INJECTION INTRAMUSCULAR; INTRAVENOUS at 19:02

## 2023-09-29 ASSESSMENT — ENCOUNTER SYMPTOMS
ANAL BLEEDING: 1
NUMBNESS: 1
FEVER: 0
LIGHT-HEADEDNESS: 1
DECREASED CONCENTRATION: 1
COUGH: 0
SORE THROAT: 0
BACK PAIN: 1
MYALGIAS: 1
DIZZINESS: 1
HEADACHES: 1
CHILLS: 0
NERVOUS/ANXIOUS: 1
PALPITATIONS: 1
SHORTNESS OF BREATH: 0
ABDOMINAL PAIN: 0

## 2023-09-29 ASSESSMENT — ACTIVITIES OF DAILY LIVING (ADL): ADLS_ACUITY_SCORE: 35

## 2023-09-29 NOTE — ED TRIAGE NOTES
"Pt has multiple complaints. She is here because for the last week she has been having fatigue, intermittent blurred vision, palpitations, lower back pain, bilateral shoulder pain, chest \"heaviness\", generalized body aches, waves of dizziness, and a headache. Today the pt had a bowel movement that had \"ministerio blood\" and felt like \"all her blood was leaving her body\". She has a hx of ulcers in the past. In triage she felt like she was going to pass out. Vitals were obtained and an EKG.        "

## 2023-09-29 NOTE — ED PROVIDER NOTES
"EMERGENCY DEPARTMENT ENCOUNTER      NAME: Annette Dietz  AGE: 37 year old female  YOB: 1985  MRN: [id number]  EVALUATION DATE & TIME: 9/29/2023  5:50 PM    PCP: Rachael Fowler    ED PROVIDER: Aaliyah Salas PA-C    Chief Complaint   Patient presents with    Fatigue    Headache    Rectal Bleeding     FINAL IMPRESSION:  1. Dizziness    2. Headache, unspecified headache type    3. Generalized anxiety disorder with panic attacks      MEDICAL DECISION MAKING:    Pertinent Labs & Imaging studies reviewed. (See chart for details)  Annette Dietz is a 37 year old female who presents for evaluation of dizziness.  For the past week, patient has been experiencing multiple symptoms including dizziness, blurry vision, headache, chest tightness, weakness, body aches, nausea and feelings of near passing out.  Today, she went to use the bathroom and noticed \"bright red blood in the toilet bowl\" this concerned her prompting ER evaluation today.  Has a history of panic attacks and felt her symptoms were initially panic attack, however reports they were different than normal.  Does not follow with a primary care provider for her anxiety.  Denies known sick contacts.  Denies syncope, shortness of breath, upper back pain, neck pain, fever, chills, diarrhea, constipation..     On my initial evaluation, patient initially hypertensive and tachycardic.  These improved throughout ER visit.  On physical exam patient is very anxious appearing, laying flat in bed.  Has obvious tremors to her upper and lower extremity.  Does not appear uncomfortable, ill or toxic however.  Heart sounds are normal, lungs are clear without wheezing or crackles.  No abdominal tenderness on palpation.  No distention, rebound, guarding or masses.  Full neurologic exam without focal deficit.  Cranial nerves III through XII intact.  No ataxia.  No pronator drift.  Normal finger-nose-finger. No focal weakness.     Differential diagnosis includes anxiety, " panic disorder, panic attack, electrolyte abnormality, COVID, influenza, other viral URI, CVA, TIA, seizure disorder, UTI, subdural hematoma, epidural hematoma, subarachnoid hemorrhage, tension headache, migraine headache, cluster headache, other intracranial pathology, ACS, PE, gastritis, esophagitis, pancreatitis, peptic ulcer disease, cholecystitis, cholelithiasis, diverticulitis, appendicitis, pregnancy, ectopic pregnancy, arrhythmia. Emergency department workup included sick labs in addition to magnesium, troponin, D-dimer, COVID, influenza swabs, UA, EKG, CT chest PE study, CT abdomen pelvis, CTA of head and neck. Patient was given Ativan and migraine cocktail including IV fluids, IV Reglan, IV Decadron and IV Benadryl with significant improvement in symptoms.    Unclear cause for patient's symptoms today, however a very reassuring work-up was done today.  Did obtain basic labs which were negative for any abnormality of electrolytes.  No significant chest pain, troponin is normal, EKG without T wave inversion, ST elevation or ST depression, so very low suspicion that this is acute ACS or other cardiac etiology.  TSA was recently normal, so this was not repeated today and low suspicion for thyroid disorder.  Hemoglobin is normal, low suspicion for anemia.  unfortunately, her D-dimer was elevated, did have to obtain this with initial tachycardia and feelings of shortness of breath, unable to PERC out ultimately, PE study was run which was negative for PE, aortic dissection or other acute abnormality.  COVID, influenza, RSV all negative.  Infectious mononucleosis negative.  Urine without infection, low suspicion for UTI or pyelonephritis.  Did obtain a CT of her head or neck which were negative for acute intracranial pathology that could be contributing to dizziness or feelings of near passing out.  She is neurologically intact, I have low suspicion for CVA or TIA.  CT abdomen and pelvis generally unremarkable.   Did show abnormality of her left breast which I discussed with the patient.  Otherwise negative for acute intra abdominal pathology as listed above.  No episodes of passing out, tongue biting, low suspicion for seizure disorder.  Pregnancy is negative, low suspicion for ectopic pregnancy.    Suspect symptoms could be due to anxiety or panic disorder.  Has been seen in the ER in the past for the symptoms with ultimately negative work-ups as well.  Patient reports feeling anxious this past week.  She did get Ativan today with significant improvement of her symptoms.  On recheck, patient reports feeling well and is ready for discharge.  Will send home with short course of hydroxyzine and advised her to follow-up with her PCP for ongoing management of possible panic disorder.  Otherwise reassuring work-up today.  Patient is clinically well-appearing and vitally stable.  Low suspicion for any emergent process that require further intervention or hospital mission.  Provided expectant management as well as return precautions.    Of note, at the end of the conversation, patient's  pulled me and Dr. Herring aside and we discussed his concern that patient's symptoms are likely related to panic attacks or anxiety and he is wondering if there are any other interventions he can do to help her at home.    Patient has had serial examinations and notes significant improvement.     Patient was discharged in stable condition with treatment plan as below. Instructed to follow up with primary care provider in 3 days and return to the emergency department with any new or worsening of symptoms. Patient expressed understanding, feels comfortable, and is in agreement with this plan. All questions addressed prior to discharge.    Medical Decision Making    History:  Supplemental history from: Documented in chart, if applicable  External Record(s) reviewed: Documented in chart, if applicable.    Work Up:  Chart documentation includes  differential considered and any EKGs or imaging independently interpreted by provider, where specified.  In additional to work up documented, I considered the following work up: Documented in chart, if applicable.    External consultation:  Discussion of management with another provider: Documented in chart, if applicable    Complicating factors:  Care impacted by chronic illness: N/A  Care affected by social determinants of health: N/A    Disposition considerations: Discharge. I prescribed additional prescription strength medication(s) as charted. N/A.    ED COURSE:  6:00 PM  I reviewed the patient's chart. I met with the patient to gather history and to perform my initial exam.   8:32 PM I staffed this patient case with Dr. Herring who agrees with plan at this time and will see the patient for their history, exam, and complete evaluation.  8:37 PM I rechecked the patient and updated them on laboratory and imaging results.  8:56 PM  We discussed plan for discharge including treatment plan, follow-up and return precautions to emergency department.  Patient voiced understanding and in agreement with this plan.    At the conclusion of the encounter I discussed the results of all of the tests and the disposition. The questions were answered. The patient or family acknowledged understanding and was agreeable with the care plan.     Voice recognition software was used in the creation of this note. Any grammatical or nonsensical errors are due to inherent errors with the software and are not the intention of the writer.     MEDICATIONS GIVEN IN THE EMERGENCY:  Medications   sodium chloride 0.9% BOLUS 1,000 mL (0 mLs Intravenous Stopped 9/29/23 1908)   LORazepam (ATIVAN) injection 1 mg (1 mg Intravenous $Given 9/29/23 1858)   dexAMETHasone PF (DECADRON) injection 10 mg (10 mg Intravenous $Given 9/29/23 1902)   diphenhydrAMINE (BENADRYL) injection 25 mg (25 mg Intravenous $Given 9/29/23 1859)   metoclopramide (REGLAN)  "injection 10 mg (10 mg Intravenous $Given 9/29/23 1902)   iopamidol (ISOVUE-370) solution 75 mL (75 mLs Intravenous $Given 9/29/23 1948)   iopamidol (ISOVUE-370) solution 75 mL (75 mLs Intravenous $Given 9/29/23 1939)     NEW PRESCRIPTIONS STARTED AT TODAY'S ER VISIT  Discharge Medication List as of 9/29/2023  8:54 PM        START taking these medications    Details   hydrOXYzine (ATARAX) 25 MG tablet Take 1 tablet (25 mg) by mouth 3 times daily as needed for itching, Disp-20 tablet, R-0, E-Prescribe           =================================================================    HPI:    Patient information was obtained from: Patient     Use of Interpretor: N/A         Annette Dietz is a 37 year old female with no pertinent history on file who presents to this ED by walk in for evaluation of headache and rectal bleeding.    Patient reports that over the past week she has had decreased concentration, dizziness and lightheadedness described as both vertigo and feeling near syncope, a pounding headache, intermittent blurred vision, palpitations, body aches, lower back pain,  whole body stiffness, and numbness in extremities. Patient says that 4 days ago her whole body became stiff and she took a \"asian medication\" and it felt improved. Patient also says that she gets \"waves of fainting\" that feels like an anxiety attack. Patient had an episode of blood in the toilet bowl today which prompted her ER visit. She says the blood came after the stool and she denies heavy straining with BM but notes that she has a mass near her rectum that feels like a hemorrhoid. .She reports that she has been camping all summer and thinks she might have lyme's disease. Patient denies chest pain, shortness of breath, chance of pregnancy, sick contacts, abdominal pain, fevers, chills, sore throat, cough, urinary symptoms, and drug, alcohol, and marijuana use.     REVIEW OF SYSTEMS:  Review of Systems   Constitutional:  Negative for chills and " fever.   HENT:  Negative for sore throat.    Eyes:  Positive for visual disturbance (Blurred vision).   Respiratory:  Negative for cough and shortness of breath.    Cardiovascular:  Positive for palpitations. Negative for chest pain.   Gastrointestinal:  Positive for anal bleeding. Negative for abdominal pain.   Genitourinary:         Denies urinary symptoms    Musculoskeletal:  Positive for back pain (Lower) and myalgias.   Neurological:  Positive for dizziness, light-headedness, numbness (In extremities) and headaches.   Psychiatric/Behavioral:  Positive for decreased concentration. The patient is nervous/anxious.    All other systems reviewed and are negative.       PAST MEDICAL HISTORY:  History reviewed. No pertinent past medical history.    PAST SURGICAL HISTORY:  Past Surgical History:   Procedure Laterality Date    WISDOM TOOTH EXTRACTION      no comp with [place]       CURRENT MEDICATIONS:    No current facility-administered medications for this encounter.    Current Outpatient Medications:     hydrOXYzine (ATARAX) 25 MG tablet, Take 1 tablet (25 mg) by mouth 3 times daily as needed for itching, Disp: 20 tablet, Rfl: 0    cholecalciferol, vitamin D3, 5,000 unit Tab, [CHOLECALCIFEROL, VITAMIN D3, 5,000 UNIT TAB] Take 5,000 Units by mouth daily., Disp: , Rfl:     hydrOXYzine (VISTARIL) 25 MG capsule, Take 1 capsule (25 mg) by mouth 3 times daily as needed for itching or anxiety, Disp: 12 capsule, Rfl: 0    MEDICATION CANNOT BE REORDERED - PLEASE MANUALLY REORDER AND DISCONTINUE THE OLD ORDER, [DOCOSHEXANOIC ACID-EICOSAPENT 500 MG (FISH OIL) 500-100 MG CAP CAPSULE] Take 1,000 mg by mouth daily., Disp: , Rfl:     PRENATAL VIT W-CA,FE,FA,<1 MG, (PRENATAL VITAMIN ORAL), [PRENATAL VIT W-CA,FE,FA,<1 MG, (PRENATAL VITAMIN ORAL)] Take 1 tablet by mouth daily. , Disp: , Rfl:     sucralfate (CARAFATE) 1 GM tablet, Take 1 tablet (1 g) by mouth 4 times daily as needed for nausea (chest pain), Disp: 30 tablet, Rfl:  "0    ALLERGIES:  No Known Allergies    FAMILY HISTORY:  Family History   Problem Relation Age of Onset    Depression Mother     Hypertension Father     Hepatitis Brother     No Known Problems Sister     No Known Problems Daughter     No Known Problems Son     No Known Problems Paternal Grandmother     No Known Problems Sister        SOCIAL HISTORY:   Social History     Socioeconomic History    Marital status: Single     Spouse name: None    Number of children: None    Years of education: None    Highest education level: None   Tobacco Use    Smoking status: Never   Substance and Sexual Activity    Alcohol use: No    Drug use: No    Sexual activity: Yes     Partners: Male     Birth control/protection: None       VITALS:  Patient Vitals for the past 24 hrs:   BP Temp Temp src Pulse Resp SpO2 Height Weight   09/29/23 2045 -- -- -- 77 -- 98 % -- --   09/29/23 2030 118/70 -- -- -- -- -- -- --   09/29/23 1930 131/67 -- -- 76 -- 98 % -- --   09/29/23 1915 126/82 -- -- 79 -- 98 % -- --   09/29/23 1900 (!) 158/91 -- -- 75 -- 100 % -- --   09/29/23 1845 -- -- -- 80 -- 100 % -- --   09/29/23 1830 (!) 165/91 -- -- 76 -- 100 % -- --   09/29/23 1815 (!) 143/96 -- -- 79 -- 100 % -- --   09/29/23 1741 (!) 169/86 -- -- 114 -- -- -- --   09/29/23 1737 (!) 186/102 98.7  F (37.1  C) Temporal 88 16 100 % 1.562 m (5' 1.5\") 56.7 kg (125 lb)       PHYSICAL EXAM    Constitutional: Well developed, Well nourished, very anxious appearing, laying flat in bed.  Has obvious tremors to her upper and lower extremity.  Does not appear uncomfortable, ill or toxic however.   HENT: Normocephalic, Atraumatic, Bilateral external ears normal, Oropharynx normal, mucous membranes moist, Nose normal.   Neck: Normal range of motion, No tenderness, Supple, No stridor.  Eyes: PERRL, EOMI, Conjunctiva normal, No discharge.   Respiratory: Normal breath sounds, No respiratory distress, No wheezing, Speaks full sentences easily. No cough.  Cardiovascular: Normal " heart rate, Regular rhythm, No murmurs, No rubs, No gallops. Chest wall nontender.  GI: Soft, No tenderness, No masses, No flank tenderness. No rebound or guarding.  Musculoskeletal: 2+ DP pulses. No edema. No cyanosis, No clubbing. Good range of motion in all major joints. No tenderness to palpation or major deformities noted. No tenderness of the CTLS spine.   Integument: Warm, Dry, No erythema, No rash. No petechiae.  Neurologic: Alert & oriented x 3, Normal motor function, Normal sensory function, No focal deficits noted. Normal gait. ranial nerves III through XII intact.  No ataxia.  No pronator drift.  Normal finger-nose-finger. No focal weakness.   Psychiatric: Affect anxious, Judgment normal, Mood normal. Cooperative.    LAB:  All pertinent labs reviewed and interpreted.  Labs Ordered and Resulted from Time of ED Arrival to Time of ED Departure   BASIC METABOLIC PANEL - Abnormal       Result Value    Sodium 138      Potassium 3.7      Chloride 103      Carbon Dioxide (CO2) 21 (*)     Anion Gap 14      Urea Nitrogen 16.3      Creatinine 0.80      GFR Estimate >90      Calcium 9.8      Glucose 120 (*)    D DIMER QUANTITATIVE - Abnormal    D-Dimer Quantitative 0.93 (*)    ROUTINE UA WITH MICROSCOPIC REFLEX TO CULTURE - Abnormal    Color Urine Light Yellow      Appearance Urine Clear      Glucose Urine Negative      Bilirubin Urine Negative      Ketones Urine Negative      Specific Gravity Urine 1.014      Blood Urine Negative      pH Urine 6.5      Protein Albumin Urine Negative      Urobilinogen Urine <2.0      Nitrite Urine Negative      Leukocyte Esterase Urine Negative      Mucus Urine Present (*)     RBC Urine <1      WBC Urine 0      Squamous Epithelials Urine 1     CBC WITH PLATELETS - Normal    WBC Count 7.2      RBC Count 4.78      Hemoglobin 14.4      Hematocrit 41.7      MCV 87      MCH 30.1      MCHC 34.5      RDW 11.9      Platelet Count 240     MAGNESIUM - Normal    Magnesium 2.0     TROPONIN T,  HIGH SENSITIVITY - Normal    Troponin T, High Sensitivity <6     INFLUENZA A/B, RSV, & SARS-COV2 PCR - Normal    Influenza A PCR Negative      Influenza B PCR Negative      RSV PCR Negative      SARS CoV2 PCR Negative     MONONUCLEOSIS SCREEN - Normal    Mononucleosis Screen Negative     HCG QUALITATIVE URINE - Normal    hCG Urine Qualitative Negative         RADIOLOGY:  Reviewed all pertinent imaging. Please see official radiology report.  CT Chest (PE) Abdomen Pelvis w Contrast   Final Result   IMPRESSION:   1.  No acute CT abnormality of the chest, abdomen, or pelvis. Specifically, no acute pulmonary embolism or thoracic aortic dissection.   2.  Left breast asymmetry. Nonemergent mammographic follow-up is recommended.   3.  Mild hepatic steatosis.         [Recommend Follow Up: Screening mammogram]      This report will be copied to the [facility] to ensure a provider acknowledges the finding.             CTA Head Neck with Contrast   Final Result   IMPRESSION:    HEAD CT:   No acute intracranial process.      HEAD CTA:   1.  No stenosis/occlusion, high flow vascular malformation.   2.  Stable 2 mm infundibulum, less likely aneurysm at the communicating segment of the right ICA.      NECK CTA:   No measurable stenosis or dissection.             EKG:    Performed at: 1741  Rate: 115 bpm  Impression: Sinus tachycardia, no T wave inversion, ST elevation or ST depression.    I have reviewed and interpreted the EKG(s) documented above along with Dr. Avila.    PROCEDURES:   None    Diagnosis:  1. Dizziness    2. Headache, unspecified headache type    3. Generalized anxiety disorder with panic attacks        IArmando, am serving as a scribe to document services personally performed by Aaliyah Salas PA-C based on my observation and the provider's statements to me. I, Aaliyah Salas PA-C attest that Armando Reza is acting in a scribe capacity, has observed my performance of the services and has  documented them in accordance with my direction.    Aaliyah Salas PA-C  Emergency Medicine  [facility]  9/29/2023       Aaliyah Salas PA-C  09/30/23 0021

## 2023-09-30 LAB
ATRIAL RATE - MUSE: 115 BPM
DIASTOLIC BLOOD PRESSURE - MUSE: 86 MMHG
INTERPRETATION ECG - MUSE: NORMAL
P AXIS - MUSE: 70 DEGREES
PR INTERVAL - MUSE: 162 MS
QRS DURATION - MUSE: 76 MS
QT - MUSE: 320 MS
QTC - MUSE: 442 MS
R AXIS - MUSE: 55 DEGREES
SYSTOLIC BLOOD PRESSURE - MUSE: 169 MMHG
T AXIS - MUSE: 57 DEGREES
VENTRICULAR RATE- MUSE: 115 BPM

## 2023-09-30 NOTE — ED PROVIDER NOTES
"Emergency Department Midlevel Supervisory Note     I personally saw the patient and performed a substantive portion of the visit including all aspects of the medical decision making.    ED Course:  8:35 PM Aaliyah Salas PA-C staffed patient with me. I agree with their assessment and plan of management, and I will see the patient.  8:46 PM I met with the patient to introduce myself, gather additional history, perform my initial exam, and discuss the plan.     Brief HPI:     Annette Dietz is a 37 year old female who presents for evaluation of dizziness. For the past week, patient has been experiencing various symptoms including dizziness, blurry vision, headache, chest tightness, weakness, body aches, nausea and feelings of near passing out. Today, she noticed bright red blood in the toilet bowl which prompted ED visit.     I, Misti Chaudhary, am serving as a scribe to document services personally performed by Chava Jones MD, based on my observations and the provider's statements to me.   I, Chava Herring MD, attest that Misti Chaudhary was acting in a scribe capacity, has observed my performance of the services and has documented them in accordance with my direction.    Brief Physical Exam: /70   Pulse 77   Temp 98.7  F (37.1  C) (Temporal)   Resp 16   Ht 1.562 m (5' 1.5\")   Wt 56.7 kg (125 lb)   SpO2 98%   BMI 23.24 kg/m    Constitutional:  Alert, in no acute distress  EYES: Conjunctivae clear  HENT:  Atraumatic, normocephalic  Respiratory:  Respirations even, unlabored, in no acute respiratory distress  Cardiovascular:  Regular rate and rhythm, good peripheral perfusion  GI: Soft, nondistended, nontender, no palpable masses, no rebound, no guarding   Musculoskeletal:  No edema. No cyanosis. Range of motion major extremities intact.    Integument: Warm, Dry, No erythema, No rash.   Neurologic:  Alert & oriented, no focal deficits noted  Psych: Normal mood and affect     MDM:    ED Course " as of 09/29/23 2255   Fri Sep 29, 2023   2032 DASH supervisory note: 36 yo F here with 1 week of many complaints, including paresthesias, presyncope. Very anxious here. Has been seen in the ED for this before and had extensive workup, negative for stroke. She has blood in her stool.   No sign of PE, stroke, arrhythmia, anemia. Pt was given ativan and migraine cocktail. Discussed concern about anxiety and panic attacks with .     1. Dizziness    2. Headache, unspecified headache type    3. Generalized anxiety disorder with panic attacks        Labs and Imaging:  Results for orders placed or performed during the hospital encounter of 09/29/23   CTA Head Neck with Contrast    Impression    IMPRESSION:   HEAD CT:  No acute intracranial process.    HEAD CTA:  1.  No stenosis/occlusion, high flow vascular malformation.  2.  Stable 2 mm infundibulum, less likely aneurysm at the communicating segment of the right ICA.    NECK CTA:  No measurable stenosis or dissection.     CT Chest (PE) Abdomen Pelvis w Contrast   Result Value Ref Range    Radiologist flags Screening mammogram     Impression    IMPRESSION:  1.  No acute CT abnormality of the chest, abdomen, or pelvis. Specifically, no acute pulmonary embolism or thoracic aortic dissection.  2.  Left breast asymmetry. Nonemergent mammographic follow-up is recommended.  3.  Mild hepatic steatosis.      [Recommend Follow Up: Screening mammogram]    This report will be copied to the [facility] to ensure a provider acknowledges the finding.        CBC (+ platelets, no diff)   Result Value Ref Range    WBC Count 7.2 4.0 - 11.0 10e3/uL    RBC Count 4.78 3.80 - 5.20 10e6/uL    Hemoglobin 14.4 11.7 - 15.7 g/dL    Hematocrit 41.7 35.0 - 47.0 %    MCV 87 78 - 100 fL    MCH 30.1 26.5 - 33.0 pg    MCHC 34.5 31.5 - 36.5 g/dL    RDW 11.9 10.0 - 15.0 %    Platelet Count 240 150 - 450 10e3/uL   Basic metabolic panel   Result Value Ref Range    Sodium 138 135 - 145 mmol/L     Potassium 3.7 3.4 - 5.3 mmol/L    Chloride 103 98 - 107 mmol/L    Carbon Dioxide (CO2) 21 (L) 22 - 29 mmol/L    Anion Gap 14 7 - 15 mmol/L    Urea Nitrogen 16.3 6.0 - 20.0 mg/dL    Creatinine 0.80 0.51 - 0.95 mg/dL    GFR Estimate >90 >60 mL/min/1.73m2    Calcium 9.8 8.6 - 10.0 mg/dL    Glucose 120 (H) 70 - 99 mg/dL   Result Value Ref Range    Magnesium 2.0 1.7 - 2.3 mg/dL   D dimer quantitative   Result Value Ref Range    D-Dimer Quantitative 0.93 (H) 0.00 - 0.50 ug/mL FEU   Troponin T, High Sensitivity (now)   Result Value Ref Range    Troponin T, High Sensitivity <6 <=14 ng/L   Symptomatic Influenza A/B, RSV, & SARS-CoV2 PCR (COVID-19) Nasopharyngeal    Specimen: Nasopharyngeal; Swab   Result Value Ref Range    Influenza A PCR Negative Negative    Influenza B PCR Negative Negative    RSV PCR Negative Negative    SARS CoV2 PCR Negative Negative   Result Value Ref Range    Mononucleosis Screen Negative Negative   UA with Microscopic reflex to Culture    Specimen: Urine, Midstream   Result Value Ref Range    Color Urine Light Yellow Colorless, Straw, Light Yellow, Yellow    Appearance Urine Clear Clear    Glucose Urine Negative Negative mg/dL    Bilirubin Urine Negative Negative    Ketones Urine Negative Negative mg/dL    Specific Gravity Urine 1.014 1.001 - 1.030    Blood Urine Negative Negative    pH Urine 6.5 5.0 - 7.0    Protein Albumin Urine Negative Negative mg/dL    Urobilinogen Urine <2.0 <2.0 mg/dL    Nitrite Urine Negative Negative    Leukocyte Esterase Urine Negative Negative    Mucus Urine Present (A) None Seen /LPF    RBC Urine <1 <=2 /HPF    WBC Urine 0 <=5 /HPF    Squamous Epithelials Urine 1 <=1 /HPF   HCG qualitative urine   Result Value Ref Range    hCG Urine Qualitative Negative Negative     I have reviewed the relevant laboratory and radiology studies    Procedures:  I was present for the key portions of this procedure: none    Chava Herring MD  [facility]  EMERGENCY ROOM  [address] [place] [id number]  [phone]       Chava Herring MD  09/29/23 4014

## 2023-10-08 ENCOUNTER — HOSPITAL ENCOUNTER (EMERGENCY)
Facility: CLINIC | Age: 38
Discharge: HOME | End: 2023-10-09
Attending: EMERGENCY MEDICINE | Admitting: EMERGENCY MEDICINE
Payer: COMMERCIAL

## 2023-10-08 VITALS
RESPIRATION RATE: 20 BRPM | HEART RATE: 86 BPM | OXYGEN SATURATION: 96 % | HEIGHT: 61 IN | DIASTOLIC BLOOD PRESSURE: 96 MMHG | TEMPERATURE: 97.8 F | WEIGHT: 120 LBS | BODY MASS INDEX: 22.66 KG/M2 | SYSTOLIC BLOOD PRESSURE: 188 MMHG

## 2023-10-08 DIAGNOSIS — K29.00 ACUTE GASTRITIS WITHOUT HEMORRHAGE, UNSPECIFIED GASTRITIS TYPE: ICD-10-CM

## 2023-10-08 LAB
ALBUMIN SERPL BCG-MCNC: 4.5 G/DL (ref 3.5–5.2)
ALP SERPL-CCNC: 63 U/L (ref 35–104)
ALT SERPL W P-5'-P-CCNC: 10 U/L (ref 0–50)
ANION GAP SERPL CALCULATED.3IONS-SCNC: 9 MMOL/L (ref 7–15)
AST SERPL W P-5'-P-CCNC: 15 U/L (ref 0–45)
BASO+EOS+MONOS # BLD AUTO: NORMAL 10*3/UL
BASO+EOS+MONOS NFR BLD AUTO: NORMAL %
BASOPHILS # BLD AUTO: 0 10E3/UL (ref 0–0.2)
BASOPHILS NFR BLD AUTO: 0 %
BILIRUB SERPL-MCNC: 0.3 MG/DL
BUN SERPL-MCNC: 16.5 MG/DL (ref 6–20)
CALCIUM SERPL-MCNC: 9.4 MG/DL (ref 8.6–10)
CHLORIDE SERPL-SCNC: 105 MMOL/L (ref 98–107)
CREAT SERPL-MCNC: 0.71 MG/DL (ref 0.51–0.95)
DEPRECATED HCO3 PLAS-SCNC: 26 MMOL/L (ref 22–29)
EGFRCR SERPLBLD CKD-EPI 2021: >90 ML/MIN/1.73M2
EOSINOPHIL # BLD AUTO: 0.1 10E3/UL (ref 0–0.7)
EOSINOPHIL NFR BLD AUTO: 1 %
ERYTHROCYTE [DISTWIDTH] IN BLOOD BY AUTOMATED COUNT: 12 % (ref 10–15)
GLUCOSE SERPL-MCNC: 109 MG/DL (ref 70–99)
HCT VFR BLD AUTO: 37.1 % (ref 35–47)
HGB BLD-MCNC: 12.3 G/DL (ref 11.7–15.7)
IMM GRANULOCYTES # BLD: 0 10E3/UL
IMM GRANULOCYTES NFR BLD: 0 %
LYMPHOCYTES # BLD AUTO: 1.7 10E3/UL (ref 0.8–5.3)
LYMPHOCYTES NFR BLD AUTO: 19 %
MCH RBC QN AUTO: 29.5 PG (ref 26.5–33)
MCHC RBC AUTO-ENTMCNC: 33.2 G/DL (ref 31.5–36.5)
MCV RBC AUTO: 89 FL (ref 78–100)
MONOCYTES # BLD AUTO: 0.5 10E3/UL (ref 0–1.3)
MONOCYTES NFR BLD AUTO: 6 %
NEUTROPHILS # BLD AUTO: 6.6 10E3/UL (ref 1.6–8.3)
NEUTROPHILS NFR BLD AUTO: 74 %
NRBC # BLD AUTO: 0 10E3/UL
NRBC BLD AUTO-RTO: 0 /100
PLATELET # BLD AUTO: 242 10E3/UL (ref 150–450)
POTASSIUM SERPL-SCNC: 4 MMOL/L (ref 3.4–5.3)
PROT SERPL-MCNC: 7.4 G/DL (ref 6.4–8.3)
RBC # BLD AUTO: 4.17 10E6/UL (ref 3.8–5.2)
SODIUM SERPL-SCNC: 140 MMOL/L (ref 135–145)
WBC # BLD AUTO: 8.9 10E3/UL (ref 4–11)

## 2023-10-08 PROCEDURE — 85025 COMPLETE CBC W/AUTO DIFF WBC: CPT | Performed by: EMERGENCY MEDICINE

## 2023-10-08 PROCEDURE — 250N000013 HC RX MED GY IP 250 OP 250 PS 637: Performed by: EMERGENCY MEDICINE

## 2023-10-08 PROCEDURE — 250N000011 HC RX IP 250 OP 636: Mod: JZ | Performed by: EMERGENCY MEDICINE

## 2023-10-08 PROCEDURE — 96361 HYDRATE IV INFUSION ADD-ON: CPT

## 2023-10-08 PROCEDURE — 96374 THER/PROPH/DIAG INJ IV PUSH: CPT

## 2023-10-08 PROCEDURE — 36415 COLL VENOUS BLD VENIPUNCTURE: CPT | Performed by: EMERGENCY MEDICINE

## 2023-10-08 PROCEDURE — 80053 COMPREHEN METABOLIC PANEL: CPT | Performed by: EMERGENCY MEDICINE

## 2023-10-08 PROCEDURE — 99284 EMERGENCY DEPT VISIT MOD MDM: CPT | Mod: 25

## 2023-10-08 PROCEDURE — 258N000003 HC RX IP 258 OP 636: Performed by: EMERGENCY MEDICINE

## 2023-10-08 RX ORDER — MAGNESIUM HYDROXIDE/ALUMINUM HYDROXICE/SIMETHICONE 120; 1200; 1200 MG/30ML; MG/30ML; MG/30ML
15 SUSPENSION ORAL ONCE
Status: COMPLETED | OUTPATIENT
Start: 2023-10-08 | End: 2023-10-08

## 2023-10-08 RX ORDER — ONDANSETRON 2 MG/ML
4 INJECTION INTRAMUSCULAR; INTRAVENOUS EVERY 30 MIN PRN
Status: DISCONTINUED | OUTPATIENT
Start: 2023-10-08 | End: 2023-10-09 | Stop reason: HOSPADM

## 2023-10-08 RX ORDER — SUCRALFATE 1 G/1
1 TABLET ORAL ONCE
Status: COMPLETED | OUTPATIENT
Start: 2023-10-08 | End: 2023-10-08

## 2023-10-08 RX ADMIN — ALUMINUM HYDROXIDE, MAGNESIUM HYDROXIDE, AND SIMETHICONE 15 ML: 200; 200; 20 SUSPENSION ORAL at 23:30

## 2023-10-08 RX ADMIN — SODIUM CHLORIDE 1000 ML: 9 INJECTION, SOLUTION INTRAVENOUS at 23:29

## 2023-10-08 RX ADMIN — FAMOTIDINE 20 MG: 10 INJECTION INTRAVENOUS at 23:39

## 2023-10-09 RX ORDER — FAMOTIDINE 40 MG/1
40 TABLET, FILM COATED ORAL 2 TIMES DAILY
Qty: 60 TABLET | Refills: 0 | Status: SHIPPED | OUTPATIENT
Start: 2023-10-09

## 2023-10-09 NOTE — ED TRIAGE NOTES
Patient presents to the ED complaining of lower abdominal pain for the past couple month and it has been getting worse the past week.  She was seen here in the past week she states and they did a full work up for the same symptoms and found no cause.  She states she has not been taking her Prilosec for some time now for a known stomach ulcer because she thought she was doing better.  Denies vomiting, diarrhea, and constipation.       Triage Assessment       Row Name 10/08/23 9690       Triage Assessment (Adult)    Airway WDL WDL       Respiratory WDL    Respiratory WDL WDL       Skin Circulation/Temperature WDL    Skin Circulation/Temperature WDL WDL       Cardiac WDL    Cardiac WDL WDL       Peripheral/Neurovascular WDL    Peripheral Neurovascular WDL WDL       Cognitive/Neuro/Behavioral WDL    Cognitive/Neuro/Behavioral WDL WDL

## 2023-10-09 NOTE — ED PROVIDER NOTES
EMERGENCY DEPARTMENT ENCOUNTER      NAME: Annette Dietz  AGE: 38 year old female  YOB: 1985  MRN: [id number]  EVALUATION DATE & TIME: No admission date for patient encounter.    PCP: Rachael Fowler    ED PROVIDER: Benjamin Sanchez M.D.      Chief Complaint   Patient presents with    Abdominal Pain         FINAL IMPRESSION:  1. Acute gastritis without hemorrhage, unspecified gastritis type          ED COURSE & MEDICAL DECISION MAKING:    Pertinent Labs & Imaging studies reviewed. (See chart for details)  38 year old female presents to the Emergency Department for evaluation of upper abdominal pain.  Patient history of ulcers but has not been taking her omeprazole.  I suspect this is the cause.  Abdomen otherwise nontender.  No right upper quadrant tenderness.  I do not suspect hepatobiliary disease.  Given GI cocktail, IV Pepcid and IV Zofran with improvement of symptoms.  Labs are normal.  Hemoglobin is normal.  No signs of significant GI bleeding.  We will have her continue her omeprazole.  Also added Pepcid for symptom relief.  She will follow-up with her primary for recheck.  Does have an elevated blood pressure which will need to be rechecked as well.  I do not think he needs imaging of her abdomen.  No signs of appendicitis obstruction or other concerning cause.  She is nontender.  No signs of perforation.  Patient discharged home.  Will return for worsening symptoms    11:15 PM I met with the patient to gather history and to perform my initial exam. I discussed the plan for care while in the Emergency Department.   12:25 AM We discussed the plan for discharge and the patient is agreeable. Reviewed supportive cares, symptomatic treatment, outpatient follow up, and reasons to return to the Emergency Department. Patient to be discharged by ED RN.      At the conclusion of the encounter I discussed the results of all of the tests and the disposition. The questions were answered. The patient or family  acknowledged understanding and was agreeable with the care plan.     Medical Decision Making    History:  Supplemental history from: Documented in chart, if applicable  External Record(s) reviewed: Documented in chart, if applicable. and Outpatient Record: [facility] Emergency Department Visit on 9/29/23    Work Up:  Chart documentation includes differential considered and any EKGs or imaging independently interpreted by provider, where specified.  In additional to work up documented, I considered the following work up: Documented in chart, if applicable.    External consultation:  Discussion of management with another provider: Documented in chart, if applicable    Complicating factors:  Care impacted by chronic illness: N/A  Care affected by social determinants of health: N/A    Disposition considerations: Discharge. I prescribed additional prescription strength medication(s) as charted. See documentation for any additional details.         MEDICATIONS GIVEN IN THE EMERGENCY:  Medications   ondansetron (ZOFRAN) injection 4 mg (0 mg Intravenous Hold 10/8/23 2330)   alum & mag hydroxide-simethicone (MAALOX) suspension 15 mL (15 mLs Oral $Given 10/8/23 2330)   famotidine (PEPCID) injection 20 mg (20 mg Intravenous $Given 10/8/23 2339)   sucralfate (CARAFATE) tablet 1 g (1 g Oral Not Given 10/8/23 2337)   sodium chloride 0.9% BOLUS 1,000 mL (0 mLs Intravenous Stopped 10/9/23 0023)       NEW PRESCRIPTIONS STARTED AT TODAY'S ER VISIT  Discharge Medication List as of 10/9/2023 12:23 AM        START taking these medications    Details   famotidine (PEPCID) 40 MG tablet Take 1 tablet (40 mg) by mouth 2 times daily, Disp-60 tablet, R-0, Local Print                =================================================================    HPI    Patient information was obtained from: Patient    Use of : N/A         Annette Dietz is a 38 year old female with no pertinent history who presents to this ED for evaluation of  abdominal pain.    Patient reports intermittent abdominal pain over the last couple months. She has a known stomach ulcer and states she typically takes Omeprazole for it. Patient notes that her abdominal pain has been acting up again but states that it feels different than her typical pain. She describes pain as stabbing and burning. Patient was seen in the ED last week. She had three episodes of bloody stools then. She took Omeprazole last week with some relief. However, she now reports that pain has returned, prompting her to be seen in the ED. She states that tonight, she has also been feeling lightheaded. She has taken four Omeprazole without relief. Otherwise, she denies any chances of pregnancy. No previous abdominal surgeries. No other complaints at this time.     Per chart review, patient was seen at [facility] Emergency Department on 9/29/23 for dizziness. Patient was given Ativan and migraine cocktail including IV fluids, IV Reglan, IV Decadron, and IV Benadryl with significant improvement in symptoms. Basic labs were negative for any abnormality of electrolytes. Troponin normal. EKG without T wave inversion, ST elevation or ST depression. Hemoglobin is normal. D-dimer elevated. PE study was run which was negative for PE or any other acute abnormalities. COVID, influenza, and RSV all negative. Infectious mononucleosis negative. Urine without infection. CT Head Neck was negative for acute intracranial pathology. CT abdomen and pelvis generally unremarkable. Did show abnormality of her left breast which= was discussed with the patient. Otherwise negative for acute intra abdominal pathology as listed above. Pregnancy is negative. Patient was discharged home with a short course of hydroxyzine and recommended to follow up with her primary care provider.     PAST MEDICAL HISTORY:  History reviewed. No pertinent past medical history.    PAST SURGICAL HISTORY:  Past Surgical History:   Procedure Laterality Date     "WISDOM TOOTH EXTRACTION      no comp with GA           CURRENT MEDICATIONS:    Current Facility-Administered Medications   Medication    ondansetron (ZOFRAN) injection 4 mg     Current Outpatient Medications   Medication    famotidine (PEPCID) 40 MG tablet    cholecalciferol, vitamin D3, 5,000 unit Tab    hydrOXYzine (ATARAX) 25 MG tablet    hydrOXYzine (VISTARIL) 25 MG capsule    MEDICATION CANNOT BE REORDERED - PLEASE MANUALLY REORDER AND DISCONTINUE THE OLD ORDER    PRENATAL VIT W-CA,FE,FA,<1 MG, (PRENATAL VITAMIN ORAL)    sucralfate (CARAFATE) 1 GM tablet         ALLERGIES:  No Known Allergies    FAMILY HISTORY:  Family History   Problem Relation Age of Onset    Depression Mother     Hypertension Father     Hepatitis Brother     No Known Problems Sister     No Known Problems Daughter     No Known Problems Son     No Known Problems Paternal Grandmother     No Known Problems Sister        SOCIAL HISTORY:   Social History     Socioeconomic History    Marital status: Single   Tobacco Use    Smoking status: Never   Substance and Sexual Activity    Alcohol use: No    Drug use: No    Sexual activity: Yes     Partners: Male     Birth control/protection: None       VITALS:  BP (!) 188/96   Pulse 86   Temp 97.8  F (36.6  C) (Oral)   Resp 20   Ht 1.549 m (5' 1\")   Wt 54.4 kg (120 lb)   SpO2 96%   BMI 22.67 kg/m      PHYSICAL EXAM    Physical Exam  Vitals and nursing note reviewed.   Constitutional:       General: She is not in acute distress.     Appearance: She is not diaphoretic.   HENT:      Head: Atraumatic.      Mouth/Throat:      Pharynx: No oropharyngeal exudate.   Eyes:      General: No scleral icterus.     Pupils: Pupils are equal, round, and reactive to light.   Cardiovascular:      Rate and Rhythm: Normal rate and regular rhythm.      Heart sounds: Normal heart sounds.   Pulmonary:      Effort: No respiratory distress.      Breath sounds: Normal breath sounds.   Abdominal:      Palpations: Abdomen is " soft.      Tenderness: There is no abdominal tenderness. There is no guarding or rebound. Negative signs include Drummond's sign.   Musculoskeletal:         General: No tenderness.   Skin:     General: Skin is warm.      Findings: No rash.   Neurological:      General: No focal deficit present.      Mental Status: She is alert.              LAB:  All pertinent labs reviewed and interpreted.  Labs Ordered and Resulted from Time of ED Arrival to Time of ED Departure   COMPREHENSIVE METABOLIC PANEL - Abnormal       Result Value    Sodium 140      Potassium 4.0      Carbon Dioxide (CO2) 26      Anion Gap 9      Urea Nitrogen 16.5      Creatinine 0.71      GFR Estimate >90      Calcium 9.4      Chloride 105      Glucose 109 (*)     Alkaline Phosphatase 63      AST 15      ALT 10      Protein Total 7.4      Albumin 4.5      Bilirubin Total 0.3     CBC WITH PLATELETS AND DIFFERENTIAL    WBC Count 8.9      RBC Count 4.17      Hemoglobin 12.3      Hematocrit 37.1      MCV 89      MCH 29.5      MCHC 33.2      RDW 12.0      Platelet Count 242      % Neutrophils 74      % Lymphocytes 19      % Monocytes 6      Mids % (Monos, Eos, Basos)        % Eosinophils 1      % Basophils 0      % Immature Granulocytes 0      NRBCs per 100 WBC 0      Absolute Neutrophils 6.6      Absolute Lymphocytes 1.7      Absolute Monocytes 0.5      Mids Abs (Monos, Eos, Basos)        Absolute Eosinophils 0.1      Absolute Basophils 0.0      Absolute Immature Granulocytes 0.0      Absolute NRBCs 0.0         RADIOLOGY:  Reviewed all pertinent imaging. Please see official radiology report.  No orders to display           I, Melinda Reina, am serving as a scribe to document services personally performed by Dr. Benjamin Sanchez, based on my observation and the provider's statements to me. IBenjamin MD attest that Melinda Reina is acting in a scribe capacity, has observed my performance of the services and has documented them in accordance with my  direction.    Benjamin Sanchez M.D.  Emergency Medicine  [facility] EMERGENCY ROOM  [address] [facility] [id number] [phone]  Dept: [phone]       Benjamin Sanchez MD  10/09/23 0105

## 2023-12-17 ENCOUNTER — HOSPITAL ENCOUNTER (EMERGENCY)
Facility: CLINIC | Age: 38
Discharge: HOME | End: 2023-12-17
Attending: EMERGENCY MEDICINE | Admitting: EMERGENCY MEDICINE
Payer: COMMERCIAL

## 2023-12-17 ENCOUNTER — APPOINTMENT (OUTPATIENT)
Dept: CT IMAGING | Facility: CLINIC | Age: 38
End: 2023-12-17
Attending: EMERGENCY MEDICINE
Payer: COMMERCIAL

## 2023-12-17 VITALS
RESPIRATION RATE: 19 BRPM | BODY MASS INDEX: 22.67 KG/M2 | DIASTOLIC BLOOD PRESSURE: 94 MMHG | HEART RATE: 75 BPM | TEMPERATURE: 98.4 F | OXYGEN SATURATION: 99 % | SYSTOLIC BLOOD PRESSURE: 150 MMHG | WEIGHT: 120 LBS

## 2023-12-17 DIAGNOSIS — M54.9 ACUTE BACK PAIN, UNSPECIFIED BACK LOCATION, UNSPECIFIED BACK PAIN LATERALITY: ICD-10-CM

## 2023-12-17 DIAGNOSIS — R06.02 SOB (SHORTNESS OF BREATH): ICD-10-CM

## 2023-12-17 DIAGNOSIS — R10.9 ABDOMINAL PAIN, UNSPECIFIED ABDOMINAL LOCATION: ICD-10-CM

## 2023-12-17 DIAGNOSIS — R42 LIGHTHEADEDNESS: ICD-10-CM

## 2023-12-17 LAB
ALBUMIN UR-MCNC: NEGATIVE MG/DL
ANION GAP SERPL CALCULATED.3IONS-SCNC: 9 MMOL/L (ref 7–15)
APPEARANCE UR: CLEAR
ATRIAL RATE - MUSE: 77 BPM
BASOPHILS # BLD AUTO: 0 10E3/UL (ref 0–0.2)
BASOPHILS NFR BLD AUTO: 1 %
BILIRUB UR QL STRIP: NEGATIVE
BUN SERPL-MCNC: 10.6 MG/DL (ref 6–20)
CALCIUM SERPL-MCNC: 9.6 MG/DL (ref 8.6–10)
CHLORIDE SERPL-SCNC: 106 MMOL/L (ref 98–107)
COLOR UR AUTO: COLORLESS
CREAT SERPL-MCNC: 0.73 MG/DL (ref 0.51–0.95)
DEPRECATED HCO3 PLAS-SCNC: 24 MMOL/L (ref 22–29)
DIASTOLIC BLOOD PRESSURE - MUSE: 92 MMHG
EGFRCR SERPLBLD CKD-EPI 2021: >90 ML/MIN/1.73M2
EOSINOPHIL # BLD AUTO: 0 10E3/UL (ref 0–0.7)
EOSINOPHIL NFR BLD AUTO: 1 %
ERYTHROCYTE [DISTWIDTH] IN BLOOD BY AUTOMATED COUNT: 11.9 % (ref 10–15)
GLUCOSE BLDC GLUCOMTR-MCNC: 106 MG/DL (ref 70–99)
GLUCOSE SERPL-MCNC: 109 MG/DL (ref 70–99)
GLUCOSE UR STRIP-MCNC: NEGATIVE MG/DL
HBA1C MFR BLD: 5.4 %
HCG SERPL QL: NEGATIVE
HCT VFR BLD AUTO: 36.8 % (ref 35–47)
HGB BLD-MCNC: 12.4 G/DL (ref 11.7–15.7)
HGB UR QL STRIP: NEGATIVE
HOLD SPECIMEN: NORMAL
IMM GRANULOCYTES # BLD: 0 10E3/UL
IMM GRANULOCYTES NFR BLD: 0 %
INTERPRETATION ECG - MUSE: NORMAL
KETONES UR STRIP-MCNC: 20 MG/DL
LEUKOCYTE ESTERASE UR QL STRIP: NEGATIVE
LYMPHOCYTES # BLD AUTO: 1.3 10E3/UL (ref 0.8–5.3)
LYMPHOCYTES NFR BLD AUTO: 21 %
MAGNESIUM SERPL-MCNC: 2.2 MG/DL (ref 1.7–2.3)
MCH RBC QN AUTO: 29.8 PG (ref 26.5–33)
MCHC RBC AUTO-ENTMCNC: 33.7 G/DL (ref 31.5–36.5)
MCV RBC AUTO: 89 FL (ref 78–100)
MONOCYTES # BLD AUTO: 0.4 10E3/UL (ref 0–1.3)
MONOCYTES NFR BLD AUTO: 6 %
NEUTROPHILS # BLD AUTO: 4.4 10E3/UL (ref 1.6–8.3)
NEUTROPHILS NFR BLD AUTO: 71 %
NITRATE UR QL: NEGATIVE
NRBC # BLD AUTO: 0 10E3/UL
NRBC BLD AUTO-RTO: 0 /100
P AXIS - MUSE: 78 DEGREES
PH UR STRIP: 6.5 [PH] (ref 5–7)
PLATELET # BLD AUTO: 228 10E3/UL (ref 150–450)
POTASSIUM SERPL-SCNC: 3.7 MMOL/L (ref 3.4–5.3)
PR INTERVAL - MUSE: 130 MS
QRS DURATION - MUSE: 80 MS
QT - MUSE: 388 MS
QTC - MUSE: 439 MS
R AXIS - MUSE: 59 DEGREES
RBC # BLD AUTO: 4.16 10E6/UL (ref 3.8–5.2)
RBC URINE: <1 /HPF
SODIUM SERPL-SCNC: 139 MMOL/L (ref 135–145)
SP GR UR STRIP: 1.01 (ref 1–1.03)
SQUAMOUS EPITHELIAL: <1 /HPF
SYSTOLIC BLOOD PRESSURE - MUSE: 193 MMHG
T AXIS - MUSE: 47 DEGREES
TROPONIN T SERPL HS-MCNC: <6 NG/L
TSH SERPL DL<=0.005 MIU/L-ACNC: 1.63 UIU/ML (ref 0.3–4.2)
UROBILINOGEN UR STRIP-MCNC: <2 MG/DL
VENTRICULAR RATE- MUSE: 77 BPM
WBC # BLD AUTO: 6.1 10E3/UL (ref 4–11)
WBC URINE: <1 /HPF

## 2023-12-17 PROCEDURE — 96361 HYDRATE IV INFUSION ADD-ON: CPT

## 2023-12-17 PROCEDURE — 250N000011 HC RX IP 250 OP 636: Mod: JZ | Performed by: EMERGENCY MEDICINE

## 2023-12-17 PROCEDURE — 99285 EMERGENCY DEPT VISIT HI MDM: CPT | Mod: 25

## 2023-12-17 PROCEDURE — 85025 COMPLETE CBC W/AUTO DIFF WBC: CPT | Performed by: EMERGENCY MEDICINE

## 2023-12-17 PROCEDURE — 96360 HYDRATION IV INFUSION INIT: CPT | Mod: 59

## 2023-12-17 PROCEDURE — 84484 ASSAY OF TROPONIN QUANT: CPT | Performed by: EMERGENCY MEDICINE

## 2023-12-17 PROCEDURE — 83036 HEMOGLOBIN GLYCOSYLATED A1C: CPT | Performed by: EMERGENCY MEDICINE

## 2023-12-17 PROCEDURE — 93005 ELECTROCARDIOGRAM TRACING: CPT | Performed by: EMERGENCY MEDICINE

## 2023-12-17 PROCEDURE — 84443 ASSAY THYROID STIM HORMONE: CPT | Performed by: EMERGENCY MEDICINE

## 2023-12-17 PROCEDURE — 84703 CHORIONIC GONADOTROPIN ASSAY: CPT | Performed by: EMERGENCY MEDICINE

## 2023-12-17 PROCEDURE — 70450 CT HEAD/BRAIN W/O DYE: CPT

## 2023-12-17 PROCEDURE — 81001 URINALYSIS AUTO W/SCOPE: CPT | Performed by: EMERGENCY MEDICINE

## 2023-12-17 PROCEDURE — 80048 BASIC METABOLIC PNL TOTAL CA: CPT | Performed by: EMERGENCY MEDICINE

## 2023-12-17 PROCEDURE — 36415 COLL VENOUS BLD VENIPUNCTURE: CPT | Performed by: EMERGENCY MEDICINE

## 2023-12-17 PROCEDURE — 82962 GLUCOSE BLOOD TEST: CPT

## 2023-12-17 PROCEDURE — 258N000003 HC RX IP 258 OP 636: Performed by: EMERGENCY MEDICINE

## 2023-12-17 PROCEDURE — 74174 CTA ABD&PLVS W/CONTRAST: CPT

## 2023-12-17 PROCEDURE — 83735 ASSAY OF MAGNESIUM: CPT | Performed by: EMERGENCY MEDICINE

## 2023-12-17 RX ORDER — IOPAMIDOL 755 MG/ML
100 INJECTION, SOLUTION INTRAVASCULAR ONCE
Status: COMPLETED | OUTPATIENT
Start: 2023-12-17 | End: 2023-12-17

## 2023-12-17 RX ADMIN — IOPAMIDOL 90 ML: 755 INJECTION, SOLUTION INTRAVENOUS at 16:56

## 2023-12-17 RX ADMIN — SODIUM CHLORIDE 1000 ML: 9 INJECTION, SOLUTION INTRAVENOUS at 16:10

## 2023-12-17 ASSESSMENT — ENCOUNTER SYMPTOMS
DIARRHEA: 0
NUMBNESS: 1
VOMITING: 0
FEVER: 0
CONFUSION: 0
ABDOMINAL PAIN: 1
NERVOUS/ANXIOUS: 0
BACK PAIN: 1
NAUSEA: 0
LIGHT-HEADEDNESS: 1
DIZZINESS: 0
COUGH: 0
DYSURIA: 0
SHORTNESS OF BREATH: 1

## 2023-12-17 ASSESSMENT — ACTIVITIES OF DAILY LIVING (ADL): ADLS_ACUITY_SCORE: 35

## 2023-12-17 NOTE — ED PROVIDER NOTES
EMERGENCY DEPARTMENT ENCOUNTER      NAME: Annette Dietz  AGE: 38 year old female  YOB: 1985  MRN: [id number]  EVALUATION DATE & TIME: No admission date for patient encounter.    PCP: Rachael Fowler    ED PROVIDER: Cierra Meza M.D.        Chief Complaint   Patient presents with    Back Pain    Shortness of Breath    multiple complaints         FINAL IMPRESSION:    1. Lightheadedness    2. SOB (shortness of breath)    3. Acute back pain, unspecified back location, unspecified back pain laterality    4. Abdominal pain, unspecified abdominal location            MEDICAL DECISION MAKING:    Annette Dietz is a 38 year old female with history of gestational diabetes, hypercholesterolemia, anxiety, who presents to the ER with complaints of multiple complaints.    Complains of back pain as a burning sensation, chest burning, abdominal pain and burning, feeling lightheaded, some lower extremity tingling and sometimes feeling faint.  These episodes have been going on and off now for several months.  Has been seen by primary care without true diagnosis.  Has seen GI recently as well without true diagnosis.  Symptoms occurred again today.  She is appointment to see primary care, but a different provider, in 3 days.  Workup in the ER unremarkable.  Blood pressure initially quite elevated but did come down on its own and this is consistent with prior ER visits.  Last clinic note from October showed a blood pressure of 128/80.  Plan at this time is discharge home to keep her appointment with primary care office in 3 days or return to ER if anything worsens.      ED COURSE:  3:31 PM  I met with the patient to gather history and perform my exam. ED course and treatment discussed.    6:07 PM  Laboratories look good.  CT imaging reassuring.  Symptoms been going on now for several months.  She has appoint to see primary care in 3 days.  Blood pressure elevated here in the ER but improving.  She has had similar  presentations to this over the past couple of months in the ER with similar blood pressure improvement and results.  I do not think this represents hypertensive urgency or emergency.  I do suspect a degree of anxiety as part of her symptoms.  She states that when she goes to the clinic her blood pressures are in the 130s systolic.  Chart review shows a clinic note from October 13, 2023 with a blood pressure of 128/80.    At this time I feel the patient is appropriate for discharge home and ongoing outpatient workup.    I do not think that this represents rib fractures, myocarditis, pericarditis, endocarditis, ACS, PE, ruptured AAA, pneumothorax, aortic dissection, bowel obstruction, bowel ischemia, cholecystitis, kidney stone, pyelonephritis, cauda equina syndrome, discitis, epidural abscess, ovarian torsion, CVA, TIA, SAH, glaucoma, temporal arteritis, sinus thrombosis, vascular dissection, pseudotumor cerebri, meningitis, enchephalitis, hypertensive urgency or emergency, or other such etiologies.      At the conclusion of the encounter I discussed the results of all of the tests and the disposition. Their questions were answered. The patient (and any family present) acknowledged understanding and were agreeable with the care plan.      CONSULTANTS:  none        MEDICATIONS GIVEN IN THE EMERGENCY:  Medications   sodium chloride 0.9% BOLUS 1,000 mL (0 mLs Intravenous Stopped 12/17/23 1832)   iopamidol (ISOVUE-370) solution 100 mL (90 mLs Intravenous $Given 12/17/23 1656)           NEW PRESCRIPTIONS STARTED AT TODAY'S ER VISIT     Medication List      There are no discharge medications for this visit.             CONDITION:  stable        DISPOSITION:  D.c home with          =================================================================  =================================================================  TRIAGE ASSESSMENT:  Pt presents to the ED with multiple complaints. Pt endorses a numbness/tingling in  extremities. Pt c/o back pain. Also endorses feeling faint and slightly SOB this morning. Pt concerned of BG, states that she is prediabetic. Denies N/V, no fevers.  in triage.     Triage Assessment (Adult)       Row Name 12/17/23 2709          Triage Assessment    Airway WDL WDL        Respiratory WDL    Respiratory WDL X;rhythm/pattern     Rhythm/Pattern, Respiratory shortness of breath        Skin Circulation/Temperature WDL    Skin Circulation/Temperature WDL WDL        Cardiac WDL    Cardiac WDL X  chest burning        Peripheral/Neurovascular WDL    Peripheral Neurovascular WDL WDL        Cognitive/Neuro/Behavioral WDL    Cognitive/Neuro/Behavioral WDL WDL                          ED Triage Vitals [12/17/23 1099]   Enc Vitals Group      BP (!) 193/92      Pulse 78      Resp 18      Temp 98.4  F (36.9  C)      Temp src Temporal      SpO2 99 %      Weight 54.4 kg (120 lb)          ================================================================  ================================================================    HPI    Patient information was obtained from: patient    Use of Intrepreter: N/A      Annette Dietz is a 38 year old female with history of gestational diabetes, hypercholesterolemia, anxiety, who presents to the ER with complaints of multiple complaints.    Since the summertime she has been having episodes of back pain, chest pain, abdominal pain, feeling lightheaded, lower extremity tingling, and sometimes feeling faint.  This morning she had episode of shortness of breath and feeling faint.  She is unsure if she could have been hyperventilating or not.  Associated with some foot tingling at that time.    She has been following with primary care office but has been seeing a different provider every time.  They have tried different acid blocking medications sometimes with some improvement, sometimes not.  She followed up with GI last week and no true diagnosis found.  Sees primary care office again,  but another provider, in 3 days.    Presents again today because she has body burning sensation, especially in the chest, abdomen, and back.  This was associated again with feeling short of breath and lightheaded.  Also had the foot tingling.    She states this feels similar to when she had gestational diabetes.  She is been taking an herbal medication for diabetes which she feels like has been helping.    She reports a history of anxiety with states that this feels different.    She states that she has been seen in our ER couple times for this in the last couple of months without a true diagnosis.    At this moment she states she is feeling a lot better and really does not have any symptoms remaining.    Glucose done in triage was 106.    REVIEW OF SYSTEMS  Review of Systems   Constitutional:  Negative for fever.   Respiratory:  Positive for shortness of breath. Negative for cough.    Cardiovascular:  Positive for chest pain.   Gastrointestinal:  Positive for abdominal pain. Negative for diarrhea, nausea and vomiting.   Genitourinary:  Negative for dysuria.   Musculoskeletal:  Positive for back pain.   Neurological:  Positive for syncope (feels faint but no LOC or syncope), light-headedness and numbness (to feet when she feels SOB and sometime in the morning). Negative for dizziness.   Psychiatric/Behavioral:  Negative for confusion. The patient is not nervous/anxious.    All other systems reviewed and are negative.        PAST MEDICAL HISTORY:  No past medical history on file.      PAST SURGICAL HISTORY:  Past Surgical History:   Procedure Laterality Date    WISDOM TOOTH EXTRACTION      no comp with GA         CURRENT MEDICATIONS:    Prior to Admission medications    Medication Sig Start Date End Date Taking? Authorizing Provider   cholecalciferol, vitamin D3, 5,000 unit Tab [CHOLECALCIFEROL, VITAMIN D3, 5,000 UNIT TAB] Take 5,000 Units by mouth daily. 1/18/16   Provider, Historical   famotidine (PEPCID) 40 MG  tablet Take 1 tablet (40 mg) by mouth 2 times daily 10/9/23   Benjamin Sanchez MD   hydrOXYzine (ATARAX) 25 MG tablet Take 1 tablet (25 mg) by mouth 3 times daily as needed for itching 9/29/23   Aaliyah Salas PA-C   hydrOXYzine (VISTARIL) 25 MG capsule Take 1 capsule (25 mg) by mouth 3 times daily as needed for itching or anxiety 7/16/21   Serge Horner MD   MEDICATION CANNOT BE REORDERED - PLEASE MANUALLY REORDER AND DISCONTINUE THE OLD ORDER [DOCOSHEXANOIC ACID-EICOSAPENT 500 MG (FISH OIL) 500-100 MG CAP CAPSULE] Take 1,000 mg by mouth daily. 1/18/16   Provider, Historical   PRENATAL VIT W-CA,FE,FA,<1 MG, (PRENATAL VITAMIN ORAL) [PRENATAL VIT W-CA,FE,FA,<1 MG, (PRENATAL VITAMIN ORAL)] Take 1 tablet by mouth daily.  10/5/15   Provider, Historical   sucralfate (CARAFATE) 1 GM tablet Take 1 tablet (1 g) by mouth 4 times daily as needed for nausea (chest pain) 4/6/22   OhlKatia DO         ALLERGIES:  No Known Allergies      FAMILY HISTORY:  Family History   Problem Relation Age of Onset    Depression Mother     Hypertension Father     Hepatitis Brother     No Known Problems Sister     No Known Problems Daughter     No Known Problems Son     No Known Problems Paternal Grandmother     No Known Problems Sister          SOCIAL HISTORY:  Social History     Socioeconomic History    Marital status: Single   Tobacco Use    Smoking status: Never   Substance and Sexual Activity    Alcohol use: No    Drug use: No    Sexual activity: Yes     Partners: Male     Birth control/protection: None         VITALS:  Patient Vitals for the past 24 hrs:   BP Temp Temp src Pulse Resp SpO2 Weight   12/17/23 1830 (!) 150/94 -- -- 75 19 99 % --   12/17/23 1800 (!) 145/87 -- -- 68 -- 100 % --   12/17/23 1454 (!) 193/92 98.4  F (36.9  C) Temporal 78 18 99 % 54.4 kg (120 lb)       Wt Readings from Last 3 Encounters:   12/17/23 54.4 kg (120 lb)   10/08/23 54.4 kg (120 lb)   09/29/23 56.7 kg (125 lb)       Estimated Creatinine  Clearance: 88 mL/min (based on SCr of 0.73 mg/dL).    PHYSICAL EXAM    Constitutional:  Well developed, Well nourished, NAD  HENT:  Normocephalic, Atraumatic, Bilateral external ears normal, Nose normal. Neck- Supple, No stridor.   Eyes:  PERRL, EOMI, Conjunctiva normal, No discharge.  Respiratory:  Normal breath sounds, No respiratory distress, No wheezing, Speaks full sentences easily. No cough.   Cardiovascular:  Normal heart rate, Regular rhythm, No rubs, No gallops. Chest wall nontender.   GI:  No excessive obesity.  Bowel sounds normal, Soft, +slight upper abd tenderness, No masses, No flank tenderness. No rebound or guarding.   : deferred  Musculoskeletal: No cyanosis, No clubbing. Good range of motion in all major joints. No tenderness to palpation or major deformities noted.   Integument:  Warm, Dry, No erythema, No rash.    Neurologic:  Alert & oriented x 3, Normal motor function, Normal sensory function, No focal deficits noted. Normal gait.   Psychiatric:  Affect normal, Cooperative         LAB:  All pertinent labs reviewed and interpreted.  Recent Results (from the past 24 hour(s))   Glucose by meter    Collection Time: 12/17/23  3:00 PM   Result Value Ref Range    GLUCOSE BY METER POCT 106 (H) 70 - 99 mg/dL   Basic metabolic panel    Collection Time: 12/17/23  4:05 PM   Result Value Ref Range    Sodium 139 135 - 145 mmol/L    Potassium 3.7 3.4 - 5.3 mmol/L    Chloride 106 98 - 107 mmol/L    Carbon Dioxide (CO2) 24 22 - 29 mmol/L    Anion Gap 9 7 - 15 mmol/L    Urea Nitrogen 10.6 6.0 - 20.0 mg/dL    Creatinine 0.73 0.51 - 0.95 mg/dL    GFR Estimate >90 >60 mL/min/1.73m2    Calcium 9.6 8.6 - 10.0 mg/dL    Glucose 109 (H) 70 - 99 mg/dL   Troponin T, High Sensitivity (now)    Collection Time: 12/17/23  4:05 PM   Result Value Ref Range    Troponin T, High Sensitivity <6 <=14 ng/L   Magnesium    Collection Time: 12/17/23  4:05 PM   Result Value Ref Range    Magnesium 2.2 1.7 - 2.3 mg/dL   TSH with free T4  reflex    Collection Time: 12/17/23  4:05 PM   Result Value Ref Range    TSH 1.63 0.30 - 4.20 uIU/mL   HCG QUALitative pregnancy (blood)    Collection Time: 12/17/23  4:05 PM   Result Value Ref Range    hCG Serum Qualitative Negative Negative   Hemoglobin A1c    Collection Time: 12/17/23  4:05 PM   Result Value Ref Range    Hemoglobin A1C 5.4 <5.7 %   Extra Blue Top Tube    Collection Time: 12/17/23  4:05 PM   Result Value Ref Range    Hold Specimen JIC    Extra Red Top Tube    Collection Time: 12/17/23  4:05 PM   Result Value Ref Range    Hold Specimen JIC    Extra Green Top (Lithium Heparin) Tube    Collection Time: 12/17/23  4:05 PM   Result Value Ref Range    Hold Specimen JIC    Extra Purple Top Tube    Collection Time: 12/17/23  4:05 PM   Result Value Ref Range    Hold Specimen JIC    CBC with platelets and differential    Collection Time: 12/17/23  4:05 PM   Result Value Ref Range    WBC Count 6.1 4.0 - 11.0 10e3/uL    RBC Count 4.16 3.80 - 5.20 10e6/uL    Hemoglobin 12.4 11.7 - 15.7 g/dL    Hematocrit 36.8 35.0 - 47.0 %    MCV 89 78 - 100 fL    MCH 29.8 26.5 - 33.0 pg    MCHC 33.7 31.5 - 36.5 g/dL    RDW 11.9 10.0 - 15.0 %    Platelet Count 228 150 - 450 10e3/uL    % Neutrophils 71 %    % Lymphocytes 21 %    % Monocytes 6 %    % Eosinophils 1 %    % Basophils 1 %    % Immature Granulocytes 0 %    NRBCs per 100 WBC 0 <1 /100    Absolute Neutrophils 4.4 1.6 - 8.3 10e3/uL    Absolute Lymphocytes 1.3 0.8 - 5.3 10e3/uL    Absolute Monocytes 0.4 0.0 - 1.3 10e3/uL    Absolute Eosinophils 0.0 0.0 - 0.7 10e3/uL    Absolute Basophils 0.0 0.0 - 0.2 10e3/uL    Absolute Immature Granulocytes 0.0 <=0.4 10e3/uL    Absolute NRBCs 0.0 10e3/uL   UA with Microscopic reflex to Culture    Collection Time: 12/17/23  4:13 PM    Specimen: Urine, Clean Catch   Result Value Ref Range    Color Urine Colorless Colorless, Straw, Light Yellow, Yellow    Appearance Urine Clear Clear    Glucose Urine Negative Negative mg/dL    Bilirubin  "Urine Negative Negative    Ketones Urine 20 (A) Negative mg/dL    Specific Gravity Urine 1.008 1.001 - 1.030    Blood Urine Negative Negative    pH Urine 6.5 5.0 - 7.0    Protein Albumin Urine Negative Negative mg/dL    Urobilinogen Urine <2.0 <2.0 mg/dL    Nitrite Urine Negative Negative    Leukocyte Esterase Urine Negative Negative    RBC Urine <1 <=2 /HPF    WBC Urine <1 <=5 /HPF    Squamous Epithelials Urine <1 <=1 /HPF       No results found for: \"ABORH\"        RADIOLOGY:  Reviewed all pertinent imaging. Please see official radiology report.    CTA Chest Abdomen Pelvis w Contrast   Final Result   IMPRESSION:      1.  No aortic dissection or other acute aortic abnormality.      2.  No pulmonary embolism.      3.  No acute abnormality or specific cause of the patient's symptoms are identified in the chest, abdomen, or pelvis.      Head CT w/o contrast   Final Result   IMPRESSION:   1.  No acute intracranial process.            EKG:    Indication: Chest pain     Performed at: 14:57p  Impression: Sinus rhythm at 77 bpm.  Flipped T waves noted in lead aVR and V1 as well as possibly V3 though there is some motion artifact.  AR interval 130 ms, QRS 80 ms, QTc 439 ms.  No signs for Brugada or delta waves.  Overall unremarkable EKG.  Nonspecific ST changes compared to September 29, 2023.      I have independently reviewed and interpreted the EKG(s) documented above.        PROCEDURES:  none    Medical Decision Making    History:  Supplemental history from: Documented in chart, if applicable  External Record(s) reviewed: Documented in chart, if applicable.    Work Up:  Chart documentation includes differential considered and any EKGs or imaging independently interpreted by provider, where specified.  In additional to work up documented, I considered the following work up: Documented in chart, if applicable.    External consultation:  Discussion of management with another provider: Documented in chart, if " applicable    Complicating factors:  Care impacted by chronic illness: Other: anxiety, gestational diabetes,  Care affected by social determinants of health: N/A    Disposition considerations: Discharge. No recommendations on prescription strength medication(s). I considered admission, but ultimately discharged patient as laboratories and imaging reassuring.  Symptoms have been going now for several months.      Cierra Meza M.D. [facility]  Emergency Medicine and Medical Toxicology  Formerly [facility] EMERGENCY ROOM  [address] [id number] [phone]  Dept: [phone]           Cierra Meza MD  12/17/23 4816

## 2023-12-17 NOTE — ED TRIAGE NOTES
Pt presents to the ED with multiple complaints. Pt endorses a numbness/tingling in extremities. Pt c/o back pain. Also endorses feeling faint and slightly SOB this morning. Pt concerned of BG, states that she is prediabetic. Denies N/V, no fevers.  in triage.     Triage Assessment (Adult)       Row Name 12/17/23 8629          Triage Assessment    Airway WDL WDL        Respiratory WDL    Respiratory WDL X;rhythm/pattern     Rhythm/Pattern, Respiratory shortness of breath        Skin Circulation/Temperature WDL    Skin Circulation/Temperature WDL WDL        Cardiac WDL    Cardiac WDL X  chest burning        Peripheral/Neurovascular WDL    Peripheral Neurovascular WDL WDL        Cognitive/Neuro/Behavioral WDL    Cognitive/Neuro/Behavioral WDL WDL

## 2023-12-18 NOTE — DISCHARGE INSTRUCTIONS
All of your tests look good.  Keep your appointment to see primary care clinic on Wednesday.    Return to emergency department if you develop worsening symptoms, new numbness or weakness in arms or legs that prevents you from using them, confusion, or any other concerns.    Thank you for choosing [facility] Emergency Department.  It has been my pleasure caring for you today.     ~Dr. Susana MD

## 2023-12-19 ENCOUNTER — HOSPITAL ENCOUNTER (EMERGENCY)
Facility: CLINIC | Age: 38
Discharge: LEFT BEFORE DISCHARGE | End: 2023-12-19
Attending: EMERGENCY MEDICINE | Admitting: EMERGENCY MEDICINE
Payer: COMMERCIAL

## 2023-12-19 VITALS
HEIGHT: 61 IN | RESPIRATION RATE: 16 BRPM | DIASTOLIC BLOOD PRESSURE: 92 MMHG | HEART RATE: 69 BPM | BODY MASS INDEX: 22.66 KG/M2 | TEMPERATURE: 97.4 F | SYSTOLIC BLOOD PRESSURE: 173 MMHG | OXYGEN SATURATION: 98 % | WEIGHT: 120 LBS

## 2023-12-19 LAB
ATRIAL RATE - MUSE: 70 BPM
DIASTOLIC BLOOD PRESSURE - MUSE: NORMAL MMHG
INTERPRETATION ECG - MUSE: NORMAL
P AXIS - MUSE: 61 DEGREES
PR INTERVAL - MUSE: 128 MS
QRS DURATION - MUSE: 76 MS
QT - MUSE: 388 MS
QTC - MUSE: 419 MS
R AXIS - MUSE: 12 DEGREES
SYSTOLIC BLOOD PRESSURE - MUSE: NORMAL MMHG
T AXIS - MUSE: 15 DEGREES
VENTRICULAR RATE- MUSE: 70 BPM

## 2023-12-19 PROCEDURE — 93005 ELECTROCARDIOGRAM TRACING: CPT | Performed by: EMERGENCY MEDICINE

## 2023-12-19 PROCEDURE — 99281 EMR DPT VST MAYX REQ PHY/QHP: CPT

## 2023-12-20 NOTE — ED TRIAGE NOTES
Pt presents with complaints of high blood pressure, headache, and muscle aches that have been going on all day. Pt states she was possibly going to be put on antihypertensive medications but blood pressure became normal so was not necessary. Pt does not normally take blood pressure, but has the last couple days. Urgent care told pt to come in due to high blood pressure.

## 2024-01-09 ENCOUNTER — HOSPITAL ENCOUNTER (EMERGENCY)
Facility: CLINIC | Age: 39
Discharge: HOME | End: 2024-01-09
Attending: EMERGENCY MEDICINE | Admitting: EMERGENCY MEDICINE
Payer: COMMERCIAL

## 2024-01-09 VITALS
SYSTOLIC BLOOD PRESSURE: 111 MMHG | TEMPERATURE: 98.6 F | DIASTOLIC BLOOD PRESSURE: 83 MMHG | RESPIRATION RATE: 14 BRPM | HEART RATE: 87 BPM | WEIGHT: 120 LBS | OXYGEN SATURATION: 97 % | BODY MASS INDEX: 22.67 KG/M2

## 2024-01-09 DIAGNOSIS — R42 LIGHTHEADEDNESS: ICD-10-CM

## 2024-01-09 PROBLEM — Z86.19 HISTORY OF HELICOBACTER PYLORI INFECTION: Status: ACTIVE | Noted: 2023-10-16

## 2024-01-09 PROBLEM — N64.89 BREAST ASYMMETRY: Status: ACTIVE | Noted: 2023-10-16

## 2024-01-09 PROBLEM — K21.9 GASTROESOPHAGEAL REFLUX DISEASE WITHOUT ESOPHAGITIS: Status: ACTIVE | Noted: 2023-10-16

## 2024-01-09 PROBLEM — K76.0 HEPATIC STEATOSIS: Status: ACTIVE | Noted: 2023-10-16

## 2024-01-09 PROBLEM — I10 HTN (HYPERTENSION): Status: ACTIVE | Noted: 2023-12-21

## 2024-01-09 LAB
ALBUMIN SERPL BCG-MCNC: 4.8 G/DL (ref 3.5–5.2)
ALP SERPL-CCNC: 65 U/L (ref 40–150)
ALT SERPL W P-5'-P-CCNC: 13 U/L (ref 0–50)
ANION GAP SERPL CALCULATED.3IONS-SCNC: 10 MMOL/L (ref 7–15)
AST SERPL W P-5'-P-CCNC: 15 U/L (ref 0–45)
BASOPHILS # BLD AUTO: 0 10E3/UL (ref 0–0.2)
BASOPHILS NFR BLD AUTO: 0 %
BILIRUB DIRECT SERPL-MCNC: <0.2 MG/DL (ref 0–0.3)
BILIRUB SERPL-MCNC: 0.5 MG/DL
BUN SERPL-MCNC: 11.4 MG/DL (ref 6–20)
CALCIUM SERPL-MCNC: 9.7 MG/DL (ref 8.6–10)
CHLORIDE SERPL-SCNC: 103 MMOL/L (ref 98–107)
CREAT SERPL-MCNC: 0.77 MG/DL (ref 0.51–0.95)
D DIMER PPP FEU-MCNC: 0.42 UG/ML FEU (ref 0–0.5)
DEPRECATED HCO3 PLAS-SCNC: 27 MMOL/L (ref 22–29)
EGFRCR SERPLBLD CKD-EPI 2021: >90 ML/MIN/1.73M2
EOSINOPHIL # BLD AUTO: 0 10E3/UL (ref 0–0.7)
EOSINOPHIL NFR BLD AUTO: 0 %
ERYTHROCYTE [DISTWIDTH] IN BLOOD BY AUTOMATED COUNT: 11.6 % (ref 10–15)
GLUCOSE SERPL-MCNC: 111 MG/DL (ref 70–99)
HCG SERPL QL: NEGATIVE
HCT VFR BLD AUTO: 38.7 % (ref 35–47)
HGB BLD-MCNC: 13 G/DL (ref 11.7–15.7)
IMM GRANULOCYTES # BLD: 0 10E3/UL
IMM GRANULOCYTES NFR BLD: 0 %
LIPASE SERPL-CCNC: 40 U/L (ref 13–60)
LYMPHOCYTES # BLD AUTO: 1 10E3/UL (ref 0.8–5.3)
LYMPHOCYTES NFR BLD AUTO: 11 %
MAGNESIUM SERPL-MCNC: 2.2 MG/DL (ref 1.7–2.3)
MCH RBC QN AUTO: 29.5 PG (ref 26.5–33)
MCHC RBC AUTO-ENTMCNC: 33.6 G/DL (ref 31.5–36.5)
MCV RBC AUTO: 88 FL (ref 78–100)
MONOCYTES # BLD AUTO: 0.5 10E3/UL (ref 0–1.3)
MONOCYTES NFR BLD AUTO: 5 %
NEUTROPHILS # BLD AUTO: 7.9 10E3/UL (ref 1.6–8.3)
NEUTROPHILS NFR BLD AUTO: 84 %
NRBC # BLD AUTO: 0 10E3/UL
NRBC BLD AUTO-RTO: 0 /100
PLATELET # BLD AUTO: 270 10E3/UL (ref 150–450)
POTASSIUM SERPL-SCNC: 4.1 MMOL/L (ref 3.4–5.3)
PROT SERPL-MCNC: 7.5 G/DL (ref 6.4–8.3)
RBC # BLD AUTO: 4.41 10E6/UL (ref 3.8–5.2)
SODIUM SERPL-SCNC: 140 MMOL/L (ref 135–145)
TROPONIN T SERPL HS-MCNC: <6 NG/L
TSH SERPL DL<=0.005 MIU/L-ACNC: 1.04 UIU/ML (ref 0.3–4.2)
WBC # BLD AUTO: 9.4 10E3/UL (ref 4–11)

## 2024-01-09 PROCEDURE — 83735 ASSAY OF MAGNESIUM: CPT | Performed by: EMERGENCY MEDICINE

## 2024-01-09 PROCEDURE — 84484 ASSAY OF TROPONIN QUANT: CPT | Performed by: EMERGENCY MEDICINE

## 2024-01-09 PROCEDURE — 85379 FIBRIN DEGRADATION QUANT: CPT | Performed by: EMERGENCY MEDICINE

## 2024-01-09 PROCEDURE — 85025 COMPLETE CBC W/AUTO DIFF WBC: CPT | Performed by: EMERGENCY MEDICINE

## 2024-01-09 PROCEDURE — 80053 COMPREHEN METABOLIC PANEL: CPT | Performed by: EMERGENCY MEDICINE

## 2024-01-09 PROCEDURE — 36415 COLL VENOUS BLD VENIPUNCTURE: CPT | Performed by: EMERGENCY MEDICINE

## 2024-01-09 PROCEDURE — 83690 ASSAY OF LIPASE: CPT | Performed by: EMERGENCY MEDICINE

## 2024-01-09 PROCEDURE — 84443 ASSAY THYROID STIM HORMONE: CPT | Performed by: EMERGENCY MEDICINE

## 2024-01-09 PROCEDURE — 99284 EMERGENCY DEPT VISIT MOD MDM: CPT

## 2024-01-09 PROCEDURE — 93005 ELECTROCARDIOGRAM TRACING: CPT | Performed by: EMERGENCY MEDICINE

## 2024-01-09 PROCEDURE — 84703 CHORIONIC GONADOTROPIN ASSAY: CPT | Performed by: EMERGENCY MEDICINE

## 2024-01-09 NOTE — ED TRIAGE NOTES
"Pt states she started having palpitations on/off for the past week.  She started having more this AM and again this afternoon.  She feels dizzy.  Her heart feel \"warm\".  She started Amlodipine just before delvis.  Pt states she feels it is something with her lower intestine.     Triage Assessment (Adult)       Row Name 01/09/24 1519          Triage Assessment    Airway WDL WDL        Respiratory WDL    Respiratory WDL WDL        Skin Circulation/Temperature WDL    Skin Circulation/Temperature WDL WDL        Cardiac WDL    Cardiac WDL X     Cardiac Rhythm ST        Peripheral/Neurovascular WDL    Peripheral Neurovascular WDL WDL        Cognitive/Neuro/Behavioral WDL    Cognitive/Neuro/Behavioral WDL WDL                     "

## 2024-01-09 NOTE — Clinical Note
Annette Dietz was seen and treated in our emergency department on 1/9/2024.  She may return to work on 01/10/2024.       If you have any questions or concerns, please don't hesitate to call.      Belkis Montalvo MD

## 2024-01-09 NOTE — ED PROVIDER NOTES
EMERGENCY DEPARTMENT ENCOUNTER      NAME: Annette Dietz  AGE: 38 year old female  YOB: 1985  MRN: [id number]  EVALUATION DATE & TIME: No admission date for patient encounter.    PCP: Rachael Fowler    ED PROVIDER: Belkis Montalvo M.D.      Chief Complaint   Patient presents with    Palpitations         FINAL IMPRESSION:  1. Lightheadedness          ED COURSE & MEDICAL DECISION MAKING:    ED Course as of 01/09/24 1820   Tue Jan 09, 2024   1600 Patient with anxious affect with ongoing lightheadedness since October neuro intact and with close PMD f/u for this issue, ameanble to TSH, ddimer with  with anxiety of triage noted, repeat VS, chemistry, hcg and plan to clinically reassess. She does request full body MRI for ongoing symptoms, will defer to PMD to discuss further testing plans with need for serial reassessment and to trend symptoms over time importance emphasized with patient at this time.   1818 VS WNL now and ddimer negative thus PE ruled out with low pretest probability for PE per Wells score 1.5. Troponin <6 thus per HEART score low risk atypical chest pain, per protocol ACS is ruled out with high sensitivity troponin. VS normalized without medications or fluid and BP also improved with calming therefore likely anxiety related. Magnesium and chemistry WNL and TSH WNL. CBC WNL, LFTs and lipase WNL and hcg negative. Pt with PMD f/u this upcoming week and established with gastroenterology with crhonic npain. Patient discharged after being provided with extensive anticipatory guidance and given return precautions, importance of PMD follow-up emphasized.        Pertinent Labs & Imaging studies reviewed. (See chart for details)    N95 worn  A face shield was worn also  COVID PPE    Medical Decision Making  Obtained supplemental history:Supplemental history obtained?: No  Reviewed external records: External records reviewed?: Documented in chart  Care impacted by chronic illness:Mental Health  Care  "significantly affected by social determinants of health:N/A  Did you consider but not order tests?: Work up considered but not performed and documented in chart, if applicable  Did you interpret images independently?: Independent interpretation of ECG and images noted in documentation, when applicable.  Consultation discussion with other provider:Did you involve another provider (consultant, MH, pharmacy, etc.)?: No  Discharge. No recommendations on prescription strength medication(s). I considered admission, but discharged patient after significant clinical improvement.    At the conclusion of the encounter I discussed the results of all of the tests and the disposition. The questions were answered. The patient or family acknowledged understanding and was agreeable with the care plan.     MEDICATIONS GIVEN IN THE EMERGENCY:  Medications - No data to display    NEW PRESCRIPTIONS STARTED AT TODAY'S ER VISIT  New Prescriptions    No medications on file          =================================================================    HPI      Annette Dietz is a 38 year old female with PMHx of HTN, HLD, GERD, hepatic steatosis who presents to the ED today via private vehicle with lightheadedness.    She reports she has been lightheaded every moment of every day since October. No vertigo. No change, no new chest pain but she does feel like this is all caused by \"high blood pressure\" for which she takes lisinopril. She has chronic chest and abdominal pains, is worried she has an \"abdomen infection\", is on omeprazole after ED workups and PMD workups and seen by GI this winter, last ED visit for this slightly before christmas, no changes. She called her PMD office today because she has an appointment with PMD next week for reassessment of her chronic lightheadedness without known pathology identified on testing thus far, to tell them she is still feeling this lightheadedness, and the triage RN referred her to go to the ED for " earlier assessment. No black stools or blood in stool. No h/o anxiety disorder/VANNA, no recent life stressors. No loss of sensation/strength, no trauma.     Per chart review patient was most recently seen 12/17/2023 and 12/19/2023 for lightheadedness without pathology identified.      REVIEW OF SYSTEMS   All other systems reviewed and are negative except as noted above in HPI.    PAST MEDICAL HISTORY:  No past medical history on file.    PAST SURGICAL HISTORY:  Past Surgical History:   Procedure Laterality Date    WISDOM TOOTH EXTRACTION      no comp with GA       CURRENT MEDICATIONS:    cholecalciferol, vitamin D3, 5,000 unit Tab  famotidine (PEPCID) 40 MG tablet  hydrOXYzine (ATARAX) 25 MG tablet  hydrOXYzine (VISTARIL) 25 MG capsule  MEDICATION CANNOT BE REORDERED - PLEASE MANUALLY REORDER AND DISCONTINUE THE OLD ORDER  PRENATAL VIT W-CA,FE,FA,<1 MG, (PRENATAL VITAMIN ORAL)  sucralfate (CARAFATE) 1 GM tablet        ALLERGIES:  No Known Allergies    FAMILY HISTORY:  Family History   Problem Relation Age of Onset    Depression Mother     Hypertension Father     Hepatitis Brother     No Known Problems Sister     No Known Problems Daughter     No Known Problems Son     No Known Problems Paternal Grandmother     No Known Problems Sister        SOCIAL HISTORY:   Social History     Socioeconomic History    Marital status: Single   Tobacco Use    Smoking status: Never   Substance and Sexual Activity    Alcohol use: No    Drug use: No    Sexual activity: Yes     Partners: Male     Birth control/protection: None       VITALS:  Patient Vitals for the past 24 hrs:   BP Temp Temp src Pulse Resp SpO2 Weight   01/09/24 1726 111/83 -- -- 87 14 97 % --   01/09/24 1651 -- -- -- 109 -- -- --   01/09/24 1521 (!) 151/97 98.6  F (37  C) Oral 117 18 99 % 54.4 kg (120 lb)       PHYSICAL EXAM    GENERAL: Awake, alert.  In no acute distress.   HEENT: Normocephalic, atraumatic.  Pupils equal, round and reactive.  Conjunctiva normal.   EOMI.  NECK: No stridor or apparent deformity.  PULMONARY: Symmetrical breath sounds without distress.  Lungs clear to auscultation bilaterally without wheezes, rhonchi or rales.  CARDIO: Regular rate and rhythm.  No significant murmur, rub or gallop.  Radial pulses strong and symmetrical.  ABDOMINAL: Abdomen soft, non-distended and non-tender to palpation.  No CVAT, no palpable hepatosplenomegaly.  EXTREMITIES: No lower extremity swelling or edema.    NEURO: Alert and oriented to person, place and time.  Cranial nerves grossly intact.  No focal motor deficit.  PSYCH: Anxious affect  SKIN: No rashes      LAB:  All pertinent labs reviewed and interpreted.  Results for orders placed or performed during the hospital encounter of 01/09/24   Basic metabolic panel   Result Value Ref Range    Sodium 140 135 - 145 mmol/L    Potassium 4.1 3.4 - 5.3 mmol/L    Chloride 103 98 - 107 mmol/L    Carbon Dioxide (CO2) 27 22 - 29 mmol/L    Anion Gap 10 7 - 15 mmol/L    Urea Nitrogen 11.4 6.0 - 20.0 mg/dL    Creatinine 0.77 0.51 - 0.95 mg/dL    GFR Estimate >90 >60 mL/min/1.73m2    Calcium 9.7 8.6 - 10.0 mg/dL    Glucose 111 (H) 70 - 99 mg/dL   Hepatic function panel   Result Value Ref Range    Protein Total 7.5 6.4 - 8.3 g/dL    Albumin 4.8 3.5 - 5.2 g/dL    Bilirubin Total 0.5 <=1.2 mg/dL    Alkaline Phosphatase 65 40 - 150 U/L    AST 15 0 - 45 U/L    ALT 13 0 - 50 U/L    Bilirubin Direct <0.20 0.00 - 0.30 mg/dL   Result Value Ref Range    Lipase 40 13 - 60 U/L   Result Value Ref Range    Magnesium 2.2 1.7 - 2.3 mg/dL   TSH with free T4 reflex   Result Value Ref Range    TSH 1.04 0.30 - 4.20 uIU/mL   Result Value Ref Range    Troponin T, High Sensitivity <6 <=14 ng/L   hCG Qualitative Pregnancy   Result Value Ref Range    hCG Serum Qualitative Negative Negative   D dimer quantitative   Result Value Ref Range    D-Dimer Quantitative 0.42 0.00 - 0.50 ug/mL FEU   CBC with platelets and differential   Result Value Ref Range    WBC Count  9.4 4.0 - 11.0 10e3/uL    RBC Count 4.41 3.80 - 5.20 10e6/uL    Hemoglobin 13.0 11.7 - 15.7 g/dL    Hematocrit 38.7 35.0 - 47.0 %    MCV 88 78 - 100 fL    MCH 29.5 26.5 - 33.0 pg    MCHC 33.6 31.5 - 36.5 g/dL    RDW 11.6 10.0 - 15.0 %    Platelet Count 270 150 - 450 10e3/uL    % Neutrophils 84 %    % Lymphocytes 11 %    % Monocytes 5 %    % Eosinophils 0 %    % Basophils 0 %    % Immature Granulocytes 0 %    NRBCs per 100 WBC 0 <1 /100    Absolute Neutrophils 7.9 1.6 - 8.3 10e3/uL    Absolute Lymphocytes 1.0 0.8 - 5.3 10e3/uL    Absolute Monocytes 0.5 0.0 - 1.3 10e3/uL    Absolute Eosinophils 0.0 0.0 - 0.7 10e3/uL    Absolute Basophils 0.0 0.0 - 0.2 10e3/uL    Absolute Immature Granulocytes 0.0 <=0.4 10e3/uL    Absolute NRBCs 0.0 10e3/uL         EKG:    Reviewed and interpreted as: 1524 normal sinus rhythm without ST anomalies, , morphologically similar to prior EKG from 12/19/2023      I have independently reviewed and interpreted the EKG(s) documented above.       Belkis Montalvo MD  01/09/24 2703

## 2024-01-10 NOTE — ED NOTES
Physical assessment deferred to provider as pt had no primary RN being her entire visit was done in the lobby.  This writer is discharge RN only.

## 2024-01-17 LAB
ATRIAL RATE - MUSE: 106 BPM
DIASTOLIC BLOOD PRESSURE - MUSE: NORMAL MMHG
INTERPRETATION ECG - MUSE: NORMAL
P AXIS - MUSE: 61 DEGREES
PR INTERVAL - MUSE: 124 MS
QRS DURATION - MUSE: 78 MS
QT - MUSE: 312 MS
QTC - MUSE: 414 MS
R AXIS - MUSE: -13 DEGREES
SYSTOLIC BLOOD PRESSURE - MUSE: NORMAL MMHG
T AXIS - MUSE: -1 DEGREES
VENTRICULAR RATE- MUSE: 106 BPM

## 2024-01-27 ENCOUNTER — HEALTH MAINTENANCE LETTER (OUTPATIENT)
Age: 39
End: 2024-01-27

## 2024-02-07 NOTE — DISCHARGE INSTRUCTIONS
You were seen in the ER for fatigue, weakness, feeling of near passing out, headaches.  Your work-up today was very reassuring today.  Your blood tests and CT scans of your head, chest and belly were all normal.  We are unclear the cause of your symptoms, however we do not think any life emergent process is happening.  It is possible this could be related to anxiety disorder and panic attacks. Please follow-up with your doctor as previously scheduled on Tuesday for ER follow-up and discussion of possible treatment for anxiety and panic disorder. In the meantime, please take hydroxyzine as needed if you start feeling anxious or develop return of your symptoms. As we discussed, your CT scan of your chest did show an asymmetric portion of your left breast, we recommend that you follow-up with your doctor for this if there is concern.  Return to the ER if you have any new or worsening symptoms.   no

## 2024-06-13 ENCOUNTER — HOSPITAL ENCOUNTER (EMERGENCY)
Facility: CLINIC | Age: 39
Discharge: HOME | End: 2024-06-13
Attending: EMERGENCY MEDICINE | Admitting: EMERGENCY MEDICINE
Payer: COMMERCIAL

## 2024-06-13 ENCOUNTER — APPOINTMENT (OUTPATIENT)
Dept: CT IMAGING | Facility: CLINIC | Age: 39
End: 2024-06-13
Attending: EMERGENCY MEDICINE
Payer: COMMERCIAL

## 2024-06-13 VITALS
SYSTOLIC BLOOD PRESSURE: 157 MMHG | HEIGHT: 61 IN | DIASTOLIC BLOOD PRESSURE: 87 MMHG | HEART RATE: 86 BPM | TEMPERATURE: 98.4 F | BODY MASS INDEX: 23.6 KG/M2 | RESPIRATION RATE: 18 BRPM | OXYGEN SATURATION: 100 % | WEIGHT: 125 LBS

## 2024-06-13 DIAGNOSIS — K92.1 BLOODY STOOL: ICD-10-CM

## 2024-06-13 LAB
ALBUMIN SERPL BCG-MCNC: 4.2 G/DL (ref 3.5–5.2)
ALP SERPL-CCNC: 62 U/L (ref 40–150)
ALT SERPL W P-5'-P-CCNC: 9 U/L (ref 0–50)
ANION GAP SERPL CALCULATED.3IONS-SCNC: 9 MMOL/L (ref 7–15)
APTT PPP: 28 SECONDS (ref 22–38)
AST SERPL W P-5'-P-CCNC: 24 U/L (ref 0–45)
BASOPHILS # BLD AUTO: 0 10E3/UL (ref 0–0.2)
BASOPHILS NFR BLD AUTO: 1 %
BILIRUB DIRECT SERPL-MCNC: <0.2 MG/DL (ref 0–0.3)
BILIRUB SERPL-MCNC: 0.5 MG/DL
BUN SERPL-MCNC: 12.1 MG/DL (ref 6–20)
CALCIUM SERPL-MCNC: 9.4 MG/DL (ref 8.6–10)
CHLORIDE SERPL-SCNC: 104 MMOL/L (ref 98–107)
CREAT SERPL-MCNC: 0.72 MG/DL (ref 0.51–0.95)
DEPRECATED HCO3 PLAS-SCNC: 26 MMOL/L (ref 22–29)
EGFRCR SERPLBLD CKD-EPI 2021: >90 ML/MIN/1.73M2
EOSINOPHIL # BLD AUTO: 0.1 10E3/UL (ref 0–0.7)
EOSINOPHIL NFR BLD AUTO: 3 %
ERYTHROCYTE [DISTWIDTH] IN BLOOD BY AUTOMATED COUNT: 12.1 % (ref 10–15)
GLUCOSE SERPL-MCNC: 99 MG/DL (ref 70–99)
HCT VFR BLD AUTO: 36.7 % (ref 35–47)
HGB BLD-MCNC: 12 G/DL (ref 11.7–15.7)
IMM GRANULOCYTES # BLD: 0 10E3/UL
IMM GRANULOCYTES NFR BLD: 0 %
INR PPP: 1.01 (ref 0.85–1.15)
LYMPHOCYTES # BLD AUTO: 1.8 10E3/UL (ref 0.8–5.3)
LYMPHOCYTES NFR BLD AUTO: 38 %
MCH RBC QN AUTO: 29.5 PG (ref 26.5–33)
MCHC RBC AUTO-ENTMCNC: 32.7 G/DL (ref 31.5–36.5)
MCV RBC AUTO: 90 FL (ref 78–100)
MONOCYTES # BLD AUTO: 0.4 10E3/UL (ref 0–1.3)
MONOCYTES NFR BLD AUTO: 8 %
NEUTROPHILS # BLD AUTO: 2.4 10E3/UL (ref 1.6–8.3)
NEUTROPHILS NFR BLD AUTO: 51 %
NRBC # BLD AUTO: 0 10E3/UL
NRBC BLD AUTO-RTO: 0 /100
PLATELET # BLD AUTO: 215 10E3/UL (ref 150–450)
POTASSIUM SERPL-SCNC: 3.7 MMOL/L (ref 3.4–5.3)
PROT SERPL-MCNC: 6.7 G/DL (ref 6.4–8.3)
RBC # BLD AUTO: 4.07 10E6/UL (ref 3.8–5.2)
SODIUM SERPL-SCNC: 139 MMOL/L (ref 135–145)
WBC # BLD AUTO: 4.7 10E3/UL (ref 4–11)

## 2024-06-13 PROCEDURE — 250N000011 HC RX IP 250 OP 636: Mod: JZ | Performed by: EMERGENCY MEDICINE

## 2024-06-13 PROCEDURE — 74174 CTA ABD&PLVS W/CONTRAST: CPT

## 2024-06-13 PROCEDURE — 80053 COMPREHEN METABOLIC PANEL: CPT | Performed by: EMERGENCY MEDICINE

## 2024-06-13 PROCEDURE — 258N000003 HC RX IP 258 OP 636: Mod: JZ | Performed by: EMERGENCY MEDICINE

## 2024-06-13 PROCEDURE — 85730 THROMBOPLASTIN TIME PARTIAL: CPT | Performed by: EMERGENCY MEDICINE

## 2024-06-13 PROCEDURE — 36415 COLL VENOUS BLD VENIPUNCTURE: CPT | Performed by: EMERGENCY MEDICINE

## 2024-06-13 PROCEDURE — 85610 PROTHROMBIN TIME: CPT | Performed by: EMERGENCY MEDICINE

## 2024-06-13 PROCEDURE — 99285 EMERGENCY DEPT VISIT HI MDM: CPT | Mod: 25

## 2024-06-13 PROCEDURE — 96360 HYDRATION IV INFUSION INIT: CPT | Mod: 59

## 2024-06-13 PROCEDURE — 85049 AUTOMATED PLATELET COUNT: CPT | Performed by: EMERGENCY MEDICINE

## 2024-06-13 RX ORDER — IOPAMIDOL 755 MG/ML
90 INJECTION, SOLUTION INTRAVASCULAR ONCE
Status: COMPLETED | OUTPATIENT
Start: 2024-06-13 | End: 2024-06-13

## 2024-06-13 RX ADMIN — SODIUM CHLORIDE 1000 ML: 9 INJECTION, SOLUTION INTRAVENOUS at 08:26

## 2024-06-13 RX ADMIN — IOPAMIDOL 100 ML: 755 INJECTION, SOLUTION INTRAVENOUS at 09:52

## 2024-06-13 ASSESSMENT — COLUMBIA-SUICIDE SEVERITY RATING SCALE - C-SSRS
2. HAVE YOU ACTUALLY HAD ANY THOUGHTS OF KILLING YOURSELF IN THE PAST MONTH?: NO
6. HAVE YOU EVER DONE ANYTHING, STARTED TO DO ANYTHING, OR PREPARED TO DO ANYTHING TO END YOUR LIFE?: NO
1. IN THE PAST MONTH, HAVE YOU WISHED YOU WERE DEAD OR WISHED YOU COULD GO TO SLEEP AND NOT WAKE UP?: NO

## 2024-06-13 ASSESSMENT — ACTIVITIES OF DAILY LIVING (ADL)
ADLS_ACUITY_SCORE: 35
ADLS_ACUITY_SCORE: 35
ADLS_ACUITY_SCORE: 33
ADLS_ACUITY_SCORE: 35

## 2024-06-13 NOTE — ED PROVIDER NOTES
EMERGENCY DEPARTMENT ENCOUNTER      NAME: Annette Dietz  AGE: 38 year old female  YOB: 1985  MRN: [id number]  EVALUATION DATE & TIME: 2024  7:54 AM    PCP: Rachael Fowler    ED PROVIDER: Adam Mendiola D.O.      Chief Complaint   Patient presents with    Rectal Bleeding    Dizziness       FINAL IMPRESSION:  1. Bloody stool        ED COURSE & MEDICAL DECISION MAKIN:05 AM I met with the patient to gather history and to perform my initial exam. I discussed the plan for care while in the Emergency Department.  10:59 AM I spoke with Dr. Houston. Dr. Houston has recommended that the patient increases her fiber supplement and takes viki lax daily. No admission is needed. Patient should follow up.   11:00 AM I rechecked and discussed with the patient the next steps going forward. The patient is comfortable with being discharged.     Pertinent Labs & Imaging studies reviewed. (See chart for details)  38 year old female presents to the Emergency Department for evaluation of bright red blood per rectum.  Patient has had only 1 episode every time she has a bowel movement.  Had does have known history of internal hemorrhoids.  Hemoglobin on this patient is stable and at her central baseline.  She is otherwise well-appearing today.  Initial concern was for active GI bleed therefore CTA was performed which was unremarkable for obvious bleed.  Potential that this is related to her hemorrhoids, but did decide to consult with gastroenterology.  They reportedly have already performed a colonoscopy on her within the past month, and at this time recommend discharge.  They did recommend that she start MiraLAX daily instead of as needed, increase her fiber, and they will follow her up in clinic.  Patient was comfortable this plan.  Very strict return precautions were discussed.    Medical Decision Making  Obtained supplemental history:Supplemental history obtained?: No  Reviewed external records: External records  reviewed?: Outpatient Record: 1/19/2024 [facility]  Care impacted by chronic illness:N/A  Care significantly affected by social determinants of health:N/A  Did you consider but not order tests?: Work up considered but not performed and documented in chart, if applicable  Did you interpret images independently?: Independent interpretation of ECG and images noted in documentation, when applicable.  Consultation discussion with other provider:Did you involve another provider (consultant, , pharmacy, etc.)?: I discussed the care with another health care provider, see documentation for details.  Discharge. No recommendations on prescription strength medication(s). See documentation for any additional details.    At the conclusion of the encounter I discussed the results of all of the tests and the disposition. The questions were answered. The patient or family acknowledged understanding and was agreeable with the care plan.        HPI    Patient information was obtained from: Patient    Use of : None       Annette Dietz is a 38 year old female who presents to the ED by walk-in for evaluation of rectal bleeding and dizziness.     Per Chart Review: The patient was seen on 1/19/2024 at [facility] for her blood pressure. Patient was told to get back on Amlodipine daily. Told to recheck in 3 months or sooner if needed.      The patient states she started haing constipation last week. On 6/7/24, she noticed she had hard stool accompanied with blood and felt uncomfortable. She explains she has continued to see blood with her stool for the last 6 days and saw increasing amounts of blood each day. On 6/11/24, the patient reports the same problems with her stool but also reports starting to feel dizzy. Last night, she took miraLAX and bene fiber to help soften her stool. This morning, she felt the need to use the bathroom, however, didn't go. The patient also  mentions that her stomach feels off. She denies having any fevers, chest pain, nausea, or shortness of breath.     The patient mentons she has a history of hemorrhoids and had a colonoscopy done a month ago. She had the colonoscopy because she thought she might have had a stomach ulcer. However, nothing was found.     Otherwise in normal state of health. No further concerns at this time.     PAST MEDICAL HISTORY:  No past medical history on file.    PAST SURGICAL HISTORY:  Past Surgical History:   Procedure Laterality Date    WISDOM TOOTH EXTRACTION      no comp with GA         CURRENT MEDICATIONS:    No current facility-administered medications for this encounter.     Current Outpatient Medications   Medication Sig Dispense Refill    cholecalciferol, vitamin D3, 5,000 unit Tab [CHOLECALCIFEROL, VITAMIN D3, 5,000 UNIT TAB] Take 5,000 Units by mouth daily.      famotidine (PEPCID) 40 MG tablet Take 1 tablet (40 mg) by mouth 2 times daily 60 tablet 0    hydrOXYzine (ATARAX) 25 MG tablet Take 1 tablet (25 mg) by mouth 3 times daily as needed for itching 20 tablet 0    hydrOXYzine (VISTARIL) 25 MG capsule Take 1 capsule (25 mg) by mouth 3 times daily as needed for itching or anxiety 12 capsule 0    MEDICATION CANNOT BE REORDERED - PLEASE MANUALLY REORDER AND DISCONTINUE THE OLD ORDER [DOCOSHEXANOIC ACID-EICOSAPENT 500 MG (FISH OIL) 500-100 MG CAP CAPSULE] Take 1,000 mg by mouth daily.      PRENATAL VIT W-CA,FE,FA,<1 MG, (PRENATAL VITAMIN ORAL) [PRENATAL VIT W-CA,FE,FA,<1 MG, (PRENATAL VITAMIN ORAL)] Take 1 tablet by mouth daily.       sucralfate (CARAFATE) 1 GM tablet Take 1 tablet (1 g) by mouth 4 times daily as needed for nausea (chest pain) 30 tablet 0         ALLERGIES:  No Known Allergies    FAMILY HISTORY:  Family History   Problem Relation Age of Onset    Depression Mother     Hypertension Father     Hepatitis Brother     No Known Problems Sister     No Known Problems Daughter     No Known Problems Son     No  "Known Problems Paternal Grandmother     No Known Problems Sister        SOCIAL HISTORY:  Social History     Socioeconomic History    Marital status: Single   Tobacco Use    Smoking status: Never   Substance and Sexual Activity    Alcohol use: No    Drug use: No    Sexual activity: Yes     Partners: Male     Birth control/protection: None     Social Determinants of Health     Financial Resource Strain: Low Risk  (1/19/2024)    [email or web address]    Financial Resource Strain     Difficulty of Paying Living Expenses: 3   Food Insecurity: No Food Insecurity (1/19/2024)    Received from [email or web address]    Food Insecurity     Worried About Running Out of Food in the Last Year: 1   Transportation Needs: No Transportation Needs (1/19/2024)    Received from [email or web address]    Transportation Needs     Lack of Transportation (Medical): 1   Social Connections: Socially Integrated (1/19/2024)    Received from [email or web address]    Social Connections     Frequency of Communication with Friends and Family: 0   Housing Stability: Low Risk  (1/19/2024)    Received from [email or web address]    Housing Stability     Unable to Pay for Housing in the Last Year: 1       VITALS:  Patient Vitals for the past 24 hrs:   BP Temp Temp src Pulse Resp SpO2 Height Weight   06/13/24 1040 136/85 -- -- 62 -- 98 % -- --   06/13/24 0910 (!) 158/90 -- -- 61 -- 100 % -- --   06/13/24 0834 -- -- -- 66 -- 100 % -- --   06/13/24 0822 (!) 141/102 -- -- 66 -- 100 % -- --   06/13/24 0746 (!) 179/93 98.4  F (36.9  C) Oral 77 18 99 % 1.549 m (5' 1\") 56.7 kg (125 lb)       PHYSICAL EXAM    VITAL SIGNS: /85   Pulse 62   Temp 98.4  F (36.9  C) (Oral)   Resp 18   Ht 1.549 m (5' 1\")   Wt 56.7 kg (125 lb)   SpO2 98%   BMI 23.62 kg/m      General Appearance: Well-appearing, well-nourished, no acute " distress.  Head:  Normocephalic, without obvious abnormality, atraumatic  Eyes:  PERRL, conjunctiva/corneas clear, EOM's intact,  ENT:  Lips, mucosa, and tongue normal, membranes are moist without pallor  Neck:  Normal ROM, symmetrical, trachea midline    Cardio:  Regular rate and rhythm, no murmur, rub or gallop, 2+ pulses symmetric in all extremities  Pulm:  Clear to auscultation bilaterally, respirations unlabored.  Abdomen:  Soft, non-tender, no rebound or guarding.  Musculoskeletal: Full ROM, no edema, no cyanosis, good ROM of major joints  Integument:  Warm, Dry, No erythema, No rash.    Neurologic:  Alert & oriented.  No focal deficits appreciated.  Ambulatory.  Psychiatric:  Affect normal, Judgment normal, Mood normal.      LABS  Results for orders placed or performed during the hospital encounter of 06/13/24 (from the past 24 hour(s))   CBC with platelets + differential    Narrative    The following orders were created for panel order CBC with platelets + differential.  Procedure                               Abnormality         Status                     ---------                               -----------         ------                     CBC with platelets and d...[[id number]]                      Final result                 Please view results for these tests on the individual orders.   Basic metabolic panel   Result Value Ref Range    Sodium 139 135 - 145 mmol/L    Potassium 3.7 3.4 - 5.3 mmol/L    Chloride 104 98 - 107 mmol/L    Carbon Dioxide (CO2) 26 22 - 29 mmol/L    Anion Gap 9 7 - 15 mmol/L    Urea Nitrogen 12.1 6.0 - 20.0 mg/dL    Creatinine 0.72 0.51 - 0.95 mg/dL    GFR Estimate >90 >60 mL/min/1.73m2    Calcium 9.4 8.6 - 10.0 mg/dL    Glucose 99 70 - 99 mg/dL   Hepatic function panel   Result Value Ref Range    Protein Total 6.7 6.4 - 8.3 g/dL    Albumin 4.2 3.5 - 5.2 g/dL    Bilirubin Total 0.5 <=1.2 mg/dL    Alkaline Phosphatase 62 40 - 150 U/L    AST 24 0 - 45 U/L    ALT 9 0 - 50 U/L     Bilirubin Direct <0.20 0.00 - 0.30 mg/dL   INR   Result Value Ref Range    INR 1.01 0.85 - 1.15   PTT   Result Value Ref Range    aPTT 28 22 - 38 Seconds   CBC with platelets and differential   Result Value Ref Range    WBC Count 4.7 4.0 - 11.0 10e3/uL    RBC Count 4.07 3.80 - 5.20 10e6/uL    Hemoglobin 12.0 11.7 - 15.7 g/dL    Hematocrit 36.7 35.0 - 47.0 %    MCV 90 78 - 100 fL    MCH 29.5 26.5 - 33.0 pg    MCHC 32.7 31.5 - 36.5 g/dL    RDW 12.1 10.0 - 15.0 %    Platelet Count 215 150 - 450 10e3/uL    % Neutrophils 51 %    % Lymphocytes 38 %    % Monocytes 8 %    % Eosinophils 3 %    % Basophils 1 %    % Immature Granulocytes 0 %    NRBCs per 100 WBC 0 <1 /100    Absolute Neutrophils 2.4 1.6 - 8.3 10e3/uL    Absolute Lymphocytes 1.8 0.8 - 5.3 10e3/uL    Absolute Monocytes 0.4 0.0 - 1.3 10e3/uL    Absolute Eosinophils 0.1 0.0 - 0.7 10e3/uL    Absolute Basophils 0.0 0.0 - 0.2 10e3/uL    Absolute Immature Granulocytes 0.0 <=0.4 10e3/uL    Absolute NRBCs 0.0 10e3/uL   CTA Abdomen Pelvis with Contrast    Narrative    EXAM: CTA ABDOMEN PELVIS WITH CONTRAST  LOCATION: [facility]  DATE: 6/13/2024    INDICATION: GI bleed  COMPARISON: None.  TECHNIQUE: CT angiogram abdomen pelvis during arterial phase of injection of IV contrast. 2D and 3D MIP reconstructions were performed by the CT technologist. Dose reduction techniques were used.  CONTRAST: Isovue 370 100mL     FINDINGS:  ANGIOGRAM ABDOMEN/PELVIS: The abdominal aorta is nonaneurysmal without acute abnormality. The left gastric artery arises directly from the aorta and gives rise to a replaced left hepatic artery. The celiac, superior mesenteric, bilateral renal (with note   of right and left accessory renal arteries arising inferior to the KARSON), and inferior mesenteric artery are patent. The bilateral common, internal, and external iliac arteries are patent.    LOWER CHEST: Normal.    HEPATOBILIARY: Normal liver contours. Focal fat deposition  adjacent to the falciform. No worrisome liver lesions. Normal gallbladder.    PANCREAS: Normal.    SPLEEN: Normal.    ADRENAL GLANDS: Normal.    KIDNEYS/BLADDER: Normal.    BOWEL: Normal caliber small and large bowel. No findings of active bleeding. Normal appendix. No free fluid or free air.    LYMPH NODES: No lymphadenopathy.    PELVIC ORGANS: Uterus is present with IUD in place. No worrisome adnexal mass.    MUSCULOSKELETAL: No worrisome bone lesions.      Impression    IMPRESSION:  No findings of active bleeding in the abdomen or pelvis or other acute abnormality.         RADIOLOGY  CTA Abdomen Pelvis with Contrast   Final Result   IMPRESSION:   No findings of active bleeding in the abdomen or pelvis or other acute abnormality.                 MEDICATIONS GIVEN IN THE EMERGENCY:  Medications   sodium chloride 0.9% BOLUS 1,000 mL (0 mLs Intravenous Stopped 6/13/24 0945)   iopamidol (ISOVUE-370) solution 90 mL (100 mLs Intravenous $Given 6/13/24 0952)       NEW PRESCRIPTIONS STARTED AT TODAY'S ER VISIT  New Prescriptions    No medications on file        I, Mary Guillen, am serving as a scribe to document services personally performed by Adam Mendiola D.O., based on my observations and the provider's statements to me.  I, Adam Mendiola D.O., attest that Mary Guillen is acting in a scribe capacity, has observed my performance of the services and has documented them in accordance with my direction.     Adam Mendiola D.O.  Emergency Medicine  [facility] EMERGENCY ROOM  [address] [facility] [address]  [phone]  Dept: [phone]       Adam Mendiola,   06/13/24 1240

## 2024-06-13 NOTE — DISCHARGE INSTRUCTIONS
Gastroenterology recommended that she start using MiraLAX daily, and increase her fiber supplement.  They want to see you in clinic, requested to call for follow-up appointment as soon as possible.  Return to the emergency department for worsening symptoms or any other concern.

## 2024-06-13 NOTE — ED TRIAGE NOTES
Pt with constipation last week and hard BM on Friday with blood noted. Saturday BM again with blood noted. Monday BM with a lot of blood noted. Wednesday BM with lot of blood after taking miralax and fiber. Pt reports starting Monday with intermittent room spinning and lightheadness with hazy vision. Episodes last a couple of hours. 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 L sided HA noted this morning as well. Denies any numbness or tingling. Ambulatory with steady gait. Speech clear. No facial droop.      Triage Assessment (Adult)       Row Name 06/13/24 0774          Triage Assessment    Airway WDL WDL        Respiratory WDL    Respiratory WDL WDL        Skin Circulation/Temperature WDL    Skin Circulation/Temperature WDL WDL        Cardiac WDL    Cardiac WDL WDL        Peripheral/Neurovascular WDL    Peripheral Neurovascular WDL WDL        Cognitive/Neuro/Behavioral WDL    Cognitive/Neuro/Behavioral WDL X  lightheadedness and dizziness and hazy vision intermittently since Monday. Worse on Wednesday afternoon and today at work. Mild L HA when having symptoms.     Orientation oriented x 4     Speech spontaneous;logical;clear     Mood/Behavior calm;cooperative        Pupils (CN II)    Pupil PERRLA yes     Pupil Size Left 4 mm     Pupil Size Right 4 mm        Rand Coma Scale    Best Eye Response 4-->(E4) spontaneous     Best Motor Response 6-->(M6) obeys commands     Best Verbal Response 5-->(V5) oriented     Rand Coma Scale Score 15

## 2024-10-07 ENCOUNTER — HOSPITAL ENCOUNTER (EMERGENCY)
Facility: CLINIC | Age: 39
Discharge: HOME | End: 2024-10-08
Attending: EMERGENCY MEDICINE | Admitting: EMERGENCY MEDICINE
Payer: COMMERCIAL

## 2024-10-07 DIAGNOSIS — R42 LIGHTHEADEDNESS: ICD-10-CM

## 2024-10-07 LAB
ALBUMIN SERPL BCG-MCNC: 4.3 G/DL (ref 3.5–5.2)
ALBUMIN UR-MCNC: NEGATIVE MG/DL
ALP SERPL-CCNC: 76 U/L (ref 40–150)
ALT SERPL W P-5'-P-CCNC: 34 U/L (ref 0–50)
ANION GAP SERPL CALCULATED.3IONS-SCNC: 10 MMOL/L (ref 7–15)
APPEARANCE UR: CLEAR
AST SERPL W P-5'-P-CCNC: 34 U/L (ref 0–45)
ATRIAL RATE - MUSE: 64 BPM
BASOPHILS # BLD AUTO: 0 10E3/UL (ref 0–0.2)
BASOPHILS NFR BLD AUTO: 1 %
BILIRUB SERPL-MCNC: 0.2 MG/DL
BILIRUB UR QL STRIP: NEGATIVE
BUN SERPL-MCNC: 9.8 MG/DL (ref 6–20)
CALCIUM SERPL-MCNC: 9.1 MG/DL (ref 8.8–10.4)
CHLORIDE SERPL-SCNC: 106 MMOL/L (ref 98–107)
COLOR UR AUTO: COLORLESS
CREAT SERPL-MCNC: 0.7 MG/DL (ref 0.51–0.95)
DIASTOLIC BLOOD PRESSURE - MUSE: 103 MMHG
EGFRCR SERPLBLD CKD-EPI 2021: >90 ML/MIN/1.73M2
EOSINOPHIL # BLD AUTO: 0.2 10E3/UL (ref 0–0.7)
EOSINOPHIL NFR BLD AUTO: 3 %
ERYTHROCYTE [DISTWIDTH] IN BLOOD BY AUTOMATED COUNT: 12.5 % (ref 10–15)
GLUCOSE SERPL-MCNC: 96 MG/DL (ref 70–99)
GLUCOSE UR STRIP-MCNC: NEGATIVE MG/DL
HCO3 SERPL-SCNC: 25 MMOL/L (ref 22–29)
HCT VFR BLD AUTO: 39 % (ref 35–47)
HGB BLD-MCNC: 12.8 G/DL (ref 11.7–15.7)
HGB UR QL STRIP: NEGATIVE
IMM GRANULOCYTES # BLD: 0 10E3/UL
IMM GRANULOCYTES NFR BLD: 0 %
INTERPRETATION ECG - MUSE: NORMAL
KETONES UR STRIP-MCNC: NEGATIVE MG/DL
LEUKOCYTE ESTERASE UR QL STRIP: NEGATIVE
LYMPHOCYTES # BLD AUTO: 1.6 10E3/UL (ref 0.8–5.3)
LYMPHOCYTES NFR BLD AUTO: 28 %
MAGNESIUM SERPL-MCNC: 2 MG/DL (ref 1.7–2.3)
MCH RBC QN AUTO: 29.4 PG (ref 26.5–33)
MCHC RBC AUTO-ENTMCNC: 32.8 G/DL (ref 31.5–36.5)
MCV RBC AUTO: 90 FL (ref 78–100)
MONOCYTES # BLD AUTO: 0.4 10E3/UL (ref 0–1.3)
MONOCYTES NFR BLD AUTO: 7 %
MUCOUS THREADS #/AREA URNS LPF: PRESENT /LPF
NEUTROPHILS # BLD AUTO: 3.5 10E3/UL (ref 1.6–8.3)
NEUTROPHILS NFR BLD AUTO: 61 %
NITRATE UR QL: NEGATIVE
NRBC # BLD AUTO: 0 10E3/UL
NRBC BLD AUTO-RTO: 0 /100
P AXIS - MUSE: 68 DEGREES
PH UR STRIP: 6.5 [PH] (ref 5–7)
PLATELET # BLD AUTO: 229 10E3/UL (ref 150–450)
POTASSIUM SERPL-SCNC: 3.8 MMOL/L (ref 3.4–5.3)
PR INTERVAL - MUSE: 122 MS
PROT SERPL-MCNC: 7.2 G/DL (ref 6.4–8.3)
QRS DURATION - MUSE: 76 MS
QT - MUSE: 398 MS
QTC - MUSE: 410 MS
R AXIS - MUSE: 48 DEGREES
RBC # BLD AUTO: 4.35 10E6/UL (ref 3.8–5.2)
RBC URINE: 0 /HPF
SODIUM SERPL-SCNC: 141 MMOL/L (ref 135–145)
SP GR UR STRIP: 1.01 (ref 1–1.03)
SQUAMOUS EPITHELIAL: 1 /HPF
SYSTOLIC BLOOD PRESSURE - MUSE: 174 MMHG
T AXIS - MUSE: 49 DEGREES
UROBILINOGEN UR STRIP-MCNC: <2 MG/DL
VENTRICULAR RATE- MUSE: 64 BPM
WBC # BLD AUTO: 5.7 10E3/UL (ref 4–11)
WBC URINE: <1 /HPF

## 2024-10-07 PROCEDURE — 80053 COMPREHEN METABOLIC PANEL: CPT | Performed by: EMERGENCY MEDICINE

## 2024-10-07 PROCEDURE — 93005 ELECTROCARDIOGRAM TRACING: CPT | Performed by: EMERGENCY MEDICINE

## 2024-10-07 PROCEDURE — 83735 ASSAY OF MAGNESIUM: CPT | Performed by: EMERGENCY MEDICINE

## 2024-10-07 PROCEDURE — 85004 AUTOMATED DIFF WBC COUNT: CPT | Performed by: EMERGENCY MEDICINE

## 2024-10-07 PROCEDURE — 81001 URINALYSIS AUTO W/SCOPE: CPT | Performed by: EMERGENCY MEDICINE

## 2024-10-07 PROCEDURE — 99285 EMERGENCY DEPT VISIT HI MDM: CPT | Mod: 25

## 2024-10-07 PROCEDURE — 36415 COLL VENOUS BLD VENIPUNCTURE: CPT | Performed by: EMERGENCY MEDICINE

## 2024-10-07 ASSESSMENT — ENCOUNTER SYMPTOMS
LIGHT-HEADEDNESS: 1
CHILLS: 0
SHORTNESS OF BREATH: 0
FLANK PAIN: 1
DYSURIA: 0
FREQUENCY: 0
DIFFICULTY URINATING: 0
HEADACHES: 1
HEMATURIA: 0
FEVER: 0

## 2024-10-07 ASSESSMENT — COLUMBIA-SUICIDE SEVERITY RATING SCALE - C-SSRS
2. HAVE YOU ACTUALLY HAD ANY THOUGHTS OF KILLING YOURSELF IN THE PAST MONTH?: NO
1. IN THE PAST MONTH, HAVE YOU WISHED YOU WERE DEAD OR WISHED YOU COULD GO TO SLEEP AND NOT WAKE UP?: NO
6. HAVE YOU EVER DONE ANYTHING, STARTED TO DO ANYTHING, OR PREPARED TO DO ANYTHING TO END YOUR LIFE?: NO

## 2024-10-07 ASSESSMENT — ACTIVITIES OF DAILY LIVING (ADL)
ADLS_ACUITY_SCORE: 35
ADLS_ACUITY_SCORE: 35

## 2024-10-08 VITALS
WEIGHT: 130 LBS | OXYGEN SATURATION: 99 % | BODY MASS INDEX: 24.55 KG/M2 | DIASTOLIC BLOOD PRESSURE: 87 MMHG | HEIGHT: 61 IN | SYSTOLIC BLOOD PRESSURE: 139 MMHG | TEMPERATURE: 97.8 F | RESPIRATION RATE: 24 BRPM | HEART RATE: 58 BPM

## 2024-10-08 NOTE — ED PROVIDER NOTES
EMERGENCY DEPARTMENT ENCOUNTER      NAME: Annette Dietz  AGE: 39 year old female  YOB: 1985  MRN: [id number]  EVALUATION DATE & TIME: 10/7/2024  9:24 PM    PCP: Rachael Fowler    ED PROVIDER: Marisabel Avila DO      Chief Complaint   Patient presents with    Syncope    Headache         FINAL IMPRESSION:  1. Lightheadedness          ED COURSE & MEDICAL DECISION MAKIN-year-old female presented to the ED for evaluation of lightheadedness.  The patient was noted to be hypertensive upon arrival.  She was otherwise hemodynamically stable.  The patient did not appear to be in any obvious distress or discomfort at the time of her initial evaluation.  The patient's physical exam was unremarkable.    An EKG was obtained which revealed normal sinus rhythm without any concerning ST or T wave changes    CBC, CMP, magnesium, and UA were all reassuring.    No significant change was noted when checking the patient's orthostatic vital signs.    The patient was reevaluated and informed of the reassuring laboratory results.  The patient was normotensive at the time reevaluation.  The underlying etiology for the patient's lightheadedness remains unclear.  After educating and reassuring the patient she felt comfortable returning home.  The patient was instructed to follow-up with her primary care provider for reevaluation or to return back to ED sooner for any worsening dizziness/lightheadedness or for any other new or concerning symptoms.    Pertinent Labs & Imaging studies reviewed. (See chart for details)  10:10 PM I met with the patient to gather history and to perform my initial exam. We discussed plans for the ED course, including diagnostic testing and treatment.       At the conclusion of the encounter I discussed the results of all of the tests and the disposition. The questions were answered. The patient or family acknowledged understanding and was agreeable with the care plan.     Medical Decision  Making    History:  Supplemental history from: N/A  External Record(s) reviewed: Outpatient Record: Primary Care Visit 3/14/24    Work Up:  Chart documentation includes differential considered and any EKGs or imaging independently interpreted by provider, where specified.  In additional to work up documented, I considered the following work up: Documented in chart, if applicable.    External consultation:  Discussion of management with another provider: Documented in chart, if applicable    Complicating factors:  Care impacted by chronic illness: Hypertension  Care affected by social determinants of health: N/A    Disposition considerations: Discharge. No recommendations on prescription strength medication(s). See documentation for any additional details.      PPE worn: n95 mask, goggles    MEDICATIONS GIVEN IN THE EMERGENCY:  Medications - No data to display    NEW PRESCRIPTIONS STARTED AT TODAY'S ER VISIT  Discharge Medication List as of 10/8/2024 12:38 AM             =================================================================    HPI    Patient information was obtained from: Patient    Use of : N/A       Annette Dietz is a 39 year old female with a pertinent history of HTN, GERD, hypercholesterolemia, and hepatic steatosis who presents to this ED by private car with family for evaluation of lightheadedness.    The patient reports she went for a hike about a few days ago and at that time was drinking a lot of water. After the hike she ate some food which was not very salty, but the food tasted very salty to her. After eating she did feel a little better. Now for the past couple days she has been having episodes of lightheadedness. She describes the lightheadedness as a headache followed by a tightness and swollen sensation across her whole body. She has noticed that the lightheadedness improves after drinking a little bit of Gatorade. For the past couple days now she has only been consuming Gatorade.  Tonight she started to have a burning pain in her flanks and her lightheadedness was no longer improving with Gatorade, so she decided to come to the ED for evaluation. She has never fully lost consciousness during any of these episodes.    She denies any dysuria, fever, chills, chest pain, or shortness of breath. She is prescribed amlodipine which she takes as needed after routinely checking her blood pressure in the morning. She has been taking the amlodipine the last few days.    Per chart review, the patient saw her PCP on 3/14/24 reporting confusion, dizziness, vision changes, and brain fog after an IUD placement. BMP and CBC were checked at that time, all within normal limits.    REVIEW OF SYSTEMS   Review of Systems   Constitutional:  Negative for chills and fever.   Respiratory:  Negative for shortness of breath.    Cardiovascular:  Negative for chest pain.   Genitourinary:  Positive for flank pain. Negative for difficulty urinating, dysuria, frequency, hematuria and urgency.   Neurological:  Positive for light-headedness and headaches.       PAST MEDICAL HISTORY:  No past medical history on file.    PAST SURGICAL HISTORY:  Past Surgical History:   Procedure Laterality Date    WISDOM TOOTH EXTRACTION      no comp with [place]           CURRENT MEDICATIONS:    cholecalciferol, vitamin D3, 5,000 unit Tab  famotidine (PEPCID) 40 MG tablet  hydrOXYzine (ATARAX) 25 MG tablet  hydrOXYzine (VISTARIL) 25 MG capsule  MEDICATION CANNOT BE REORDERED - PLEASE MANUALLY REORDER AND DISCONTINUE THE OLD ORDER  PRENATAL VIT W-CA,FE,FA,<1 MG, (PRENATAL VITAMIN ORAL)  sucralfate (CARAFATE) 1 GM tablet        ALLERGIES:  No Known Allergies    FAMILY HISTORY:  Family History   Problem Relation Age of Onset    Depression Mother     Hypertension Father     Hepatitis Brother     No Known Problems Sister     No Known Problems Daughter     No Known Problems Son     No Known Problems Paternal Grandmother     No Known Problems Sister   "      SOCIAL HISTORY:   Social History     Socioeconomic History    Marital status: Single   Tobacco Use    Smoking status: Never   Substance and Sexual Activity    Alcohol use: No    Drug use: No    Sexual activity: Yes     Partners: Male     Birth control/protection: None     Social Determinants of Health     Financial Resource Strain: Low Risk  (1/19/2024)    Received from [employer] [facility]    Financial Resource Strain     Difficulty of Paying Living Expenses: 3   Food Insecurity: No Food Insecurity (1/19/2024)    Received from [email or web address] [facility]    Food Insecurity     Worried About Running Out of Food in the Last Year: 1   Transportation Needs: No Transportation Needs (1/19/2024)    Received from [email or web address] [facility]    Transportation Needs     Lack of Transportation (Medical): 1   Social Connections: Socially Integrated (1/19/2024)    Received from [facility] [employer] [facility]    Social Connections     Frequency of Communication with Friends and Family: 0   Housing Stability: Low Risk  (1/19/2024)    Received from [email or web address] [facility]    Housing Stability     Unable to Pay for Housing in the Last Year: 1       VITALS:  /87   Pulse 58   Temp 97.8  F (36.6  C) (Oral)   Resp 24   Ht 1.549 m (5' 1\")   Wt 59 kg (130 lb)   SpO2 99%   BMI 24.56 kg/m      PHYSICAL EXAM    General presentation: Alert, Vital signs reviewed. NAD  HENT: ENT inspection is normal. Oropharynx is moist and clear.   Eye: Pupils are equal and reactive to light. EOMI  Neck: The neck is supple, with full ROM, with no evidence of meningismus.  Pulmonary: Currently in no acute respiratory distress. Normal, non labored respirations, the lung sounds are normal with good equal air movement. Clear to auscultation bilaterally.   Circulatory: Regular rate and rhythm. Peripheral pulses are strong and equal. No murmurs, rubs, or " gallops.   Abdominal: The abdomen is soft. Nontender. No rigidity, guarding, or rebound. Bowel sounds normal.   Neurologic: Alert, oriented to person, place, and time. No motor deficit. No sensory deficit. Cranial nerves II through XII are intact.  Musculoskeletal: No extremity tenderness. Full range of motion in all extremities. No extremity edema.   Skin: Skin color is normal. No rash. Warm. Dry to touch.      LAB:  All pertinent labs reviewed and interpreted.  Results for orders placed or performed during the hospital encounter of 10/07/24   Comprehensive metabolic panel   Result Value Ref Range    Sodium 141 135 - 145 mmol/L    Potassium 3.8 3.4 - 5.3 mmol/L    Carbon Dioxide (CO2) 25 22 - 29 mmol/L    Anion Gap 10 7 - 15 mmol/L    Urea Nitrogen 9.8 6.0 - 20.0 mg/dL    Creatinine 0.70 0.51 - 0.95 mg/dL    GFR Estimate >90 >60 mL/min/1.73m2    Calcium 9.1 8.8 - 10.4 mg/dL    Chloride 106 98 - 107 mmol/L    Glucose 96 70 - 99 mg/dL    Alkaline Phosphatase 76 40 - 150 U/L    AST 34 0 - 45 U/L    ALT 34 0 - 50 U/L    Protein Total 7.2 6.4 - 8.3 g/dL    Albumin 4.3 3.5 - 5.2 g/dL    Bilirubin Total 0.2 <=1.2 mg/dL   Result Value Ref Range    Magnesium 2.0 1.7 - 2.3 mg/dL   CBC with platelets and differential   Result Value Ref Range    WBC Count 5.7 4.0 - 11.0 10e3/uL    RBC Count 4.35 3.80 - 5.20 10e6/uL    Hemoglobin 12.8 11.7 - 15.7 g/dL    Hematocrit 39.0 35.0 - 47.0 %    MCV 90 78 - 100 fL    MCH 29.4 26.5 - 33.0 pg    MCHC 32.8 31.5 - 36.5 g/dL    RDW 12.5 10.0 - 15.0 %    Platelet Count 229 150 - 450 10e3/uL    % Neutrophils 61 %    % Lymphocytes 28 %    % Monocytes 7 %    % Eosinophils 3 %    % Basophils 1 %    % Immature Granulocytes 0 %    NRBCs per 100 WBC 0 <1 /100    Absolute Neutrophils 3.5 1.6 - 8.3 10e3/uL    Absolute Lymphocytes 1.6 0.8 - 5.3 10e3/uL    Absolute Monocytes 0.4 0.0 - 1.3 10e3/uL    Absolute Eosinophils 0.2 0.0 - 0.7 10e3/uL    Absolute Basophils 0.0 0.0 - 0.2 10e3/uL    Absolute  Immature Granulocytes 0.0 <=0.4 10e3/uL    Absolute NRBCs 0.0 10e3/uL   UA with Microscopic reflex to Culture    Specimen: Urine, Midstream   Result Value Ref Range    Color Urine Colorless Colorless, Straw, Light Yellow, Yellow    Appearance Urine Clear Clear    Glucose Urine Negative Negative mg/dL    Bilirubin Urine Negative Negative    Ketones Urine Negative Negative mg/dL    Specific Gravity Urine 1.010 1.001 - 1.030    Blood Urine Negative Negative    pH Urine 6.5 5.0 - 7.0    Protein Albumin Urine Negative Negative mg/dL    Urobilinogen Urine <2.0 <2.0 mg/dL    Nitrite Urine Negative Negative    Leukocyte Esterase Urine Negative Negative    Mucus Urine Present (A) None Seen /LPF    RBC Urine 0 <=2 /HPF    WBC Urine <1 <=5 /HPF    Squamous Epithelials Urine 1 <=1 /HPF   ECG 12-LEAD WITH MUSE (LHE)   Result Value Ref Range    Systolic Blood Pressure 174 mmHg    Diastolic Blood Pressure 103 mmHg    Ventricular Rate 64 BPM    Atrial Rate 64 BPM    WA Interval 122 ms    QRS Duration 76 ms     ms    QTc 410 ms    P Axis 68 degrees    R AXIS 48 degrees    T Axis 49 degrees    Interpretation ECG       Sinus rhythm  Normal ECG  When compared with ECG of 09-Jan-2024 15:24,  Vent. rate has decreased by  42 bpm  Questionable change in QRS axis  T wave inversion no longer evident in Inferior leads  Confirmed by SEE ED PROVIDER NOTE FOR, ECG INTERPRETATION ([id number]),  MYRA SAMPSON ([id number]) on 10/7/2024 10:40:52 PM         RADIOLOGY:  Reviewed all pertinent imaging. Please see official radiology report.  No orders to display       EKG:    Normal sinus rhythm.  Rate of 64.  Normal QRS.  Normal QT.  No ST or T wave changes.  Compared to EKG on 1/9/2024 normal sinus rhythm has replaced sinus tachycardia.  No other significant changes are noted.    I have independently reviewed and interpreted the EKG(s) documented above.        I, Tutu Olivas , am serving as a scribe to document services personally performed by  Marisabel Avila DO based on my observation and the provider's statements to me. I, Marisabel Avila, attest that Tutu Olivas is acting in a scribe capacity, has observed my performance of the services and has documented them in accordance with my direction.    Marisabel Avila DO  Emergency Medicine  [facility] EMERGENCY ROOM  [address] [address]  [phone]     Marisabel Avila DO  10/08/24 0108

## 2024-10-08 NOTE — ED TRIAGE NOTES
Reports intermittent lightheadedness, heart racing, headache, muscle aches, swollen legs and bilat kidney pain and feeling like she is going to pass out x 1 week  Pt is alert and oriented in triage, skin w/d and pink       Triage Assessment (Adult)       Row Name 10/07/24 3663          Triage Assessment    Airway WDL WDL        Respiratory WDL    Respiratory WDL WDL        Skin Circulation/Temperature WDL    Skin Circulation/Temperature WDL WDL        Cardiac WDL    Cardiac WDL WDL        Peripheral/Neurovascular WDL    Peripheral Neurovascular WDL WDL        Cognitive/Neuro/Behavioral WDL    Cognitive/Neuro/Behavioral WDL WDL

## 2024-10-08 NOTE — DISCHARGE INSTRUCTIONS
Your laboratory tests, urinalysis, and EKG results all appear reassuring.  The neck    Follow-up with your primary care provider for reevaluation.    Return back to ED sooner for any new or concerning symptoms.

## 2024-10-17 ENCOUNTER — HOSPITAL ENCOUNTER (EMERGENCY)
Facility: CLINIC | Age: 39
Discharge: HOME | End: 2024-10-17
Attending: EMERGENCY MEDICINE | Admitting: EMERGENCY MEDICINE
Payer: COMMERCIAL

## 2024-10-17 VITALS
WEIGHT: 130 LBS | SYSTOLIC BLOOD PRESSURE: 134 MMHG | TEMPERATURE: 98.5 F | HEART RATE: 59 BPM | DIASTOLIC BLOOD PRESSURE: 79 MMHG | OXYGEN SATURATION: 100 % | BODY MASS INDEX: 24.56 KG/M2 | RESPIRATION RATE: 23 BRPM

## 2024-10-17 DIAGNOSIS — R20.2 PARESTHESIA: ICD-10-CM

## 2024-10-17 DIAGNOSIS — R42 LIGHTHEADEDNESS: ICD-10-CM

## 2024-10-17 LAB
ALBUMIN SERPL BCG-MCNC: 4.3 G/DL (ref 3.5–5.2)
ALP SERPL-CCNC: 63 U/L (ref 40–150)
ALT SERPL W P-5'-P-CCNC: 66 U/L (ref 0–50)
ANION GAP SERPL CALCULATED.3IONS-SCNC: 11 MMOL/L (ref 7–15)
AST SERPL W P-5'-P-CCNC: 38 U/L (ref 0–45)
ATRIAL RATE - MUSE: 74 BPM
BASOPHILS # BLD AUTO: 0.1 10E3/UL (ref 0–0.2)
BASOPHILS NFR BLD AUTO: 1 %
BILIRUB SERPL-MCNC: 0.4 MG/DL
BUN SERPL-MCNC: 15.8 MG/DL (ref 6–20)
CALCIUM SERPL-MCNC: 9.2 MG/DL (ref 8.8–10.4)
CHLORIDE SERPL-SCNC: 103 MMOL/L (ref 98–107)
CREAT SERPL-MCNC: 0.74 MG/DL (ref 0.51–0.95)
DIASTOLIC BLOOD PRESSURE - MUSE: NORMAL MMHG
EGFRCR SERPLBLD CKD-EPI 2021: >90 ML/MIN/1.73M2
EOSINOPHIL # BLD AUTO: 0.1 10E3/UL (ref 0–0.7)
EOSINOPHIL NFR BLD AUTO: 2 %
ERYTHROCYTE [DISTWIDTH] IN BLOOD BY AUTOMATED COUNT: 12.1 % (ref 10–15)
GLUCOSE SERPL-MCNC: 128 MG/DL (ref 70–99)
HCG SERPL QL: NEGATIVE
HCO3 SERPL-SCNC: 24 MMOL/L (ref 22–29)
HCT VFR BLD AUTO: 38.8 % (ref 35–47)
HGB BLD-MCNC: 12.7 G/DL (ref 11.7–15.7)
IMM GRANULOCYTES # BLD: 0 10E3/UL
IMM GRANULOCYTES NFR BLD: 0 %
INTERPRETATION ECG - MUSE: NORMAL
LIPASE SERPL-CCNC: 36 U/L (ref 13–60)
LYMPHOCYTES # BLD AUTO: 1.6 10E3/UL (ref 0.8–5.3)
LYMPHOCYTES NFR BLD AUTO: 19 %
MAGNESIUM SERPL-MCNC: 1.9 MG/DL (ref 1.7–2.3)
MCH RBC QN AUTO: 29.3 PG (ref 26.5–33)
MCHC RBC AUTO-ENTMCNC: 32.7 G/DL (ref 31.5–36.5)
MCV RBC AUTO: 89 FL (ref 78–100)
MONOCYTES # BLD AUTO: 0.7 10E3/UL (ref 0–1.3)
MONOCYTES NFR BLD AUTO: 8 %
NEUTROPHILS # BLD AUTO: 5.7 10E3/UL (ref 1.6–8.3)
NEUTROPHILS NFR BLD AUTO: 70 %
NRBC # BLD AUTO: 0 10E3/UL
NRBC BLD AUTO-RTO: 0 /100
P AXIS - MUSE: 56 DEGREES
PLATELET # BLD AUTO: 226 10E3/UL (ref 150–450)
POTASSIUM SERPL-SCNC: 3.5 MMOL/L (ref 3.4–5.3)
PR INTERVAL - MUSE: 124 MS
PROT SERPL-MCNC: 7 G/DL (ref 6.4–8.3)
QRS DURATION - MUSE: 74 MS
QT - MUSE: 362 MS
QTC - MUSE: 401 MS
R AXIS - MUSE: 5 DEGREES
RBC # BLD AUTO: 4.34 10E6/UL (ref 3.8–5.2)
SODIUM SERPL-SCNC: 138 MMOL/L (ref 135–145)
SYSTOLIC BLOOD PRESSURE - MUSE: NORMAL MMHG
T AXIS - MUSE: 13 DEGREES
TROPONIN T SERPL HS-MCNC: <6 NG/L
TSH SERPL DL<=0.005 MIU/L-ACNC: 3.04 UIU/ML (ref 0.3–4.2)
VENTRICULAR RATE- MUSE: 74 BPM
WBC # BLD AUTO: 8.2 10E3/UL (ref 4–11)

## 2024-10-17 PROCEDURE — 83690 ASSAY OF LIPASE: CPT | Performed by: EMERGENCY MEDICINE

## 2024-10-17 PROCEDURE — 84443 ASSAY THYROID STIM HORMONE: CPT | Performed by: EMERGENCY MEDICINE

## 2024-10-17 PROCEDURE — 84484 ASSAY OF TROPONIN QUANT: CPT | Performed by: EMERGENCY MEDICINE

## 2024-10-17 PROCEDURE — 85025 COMPLETE CBC W/AUTO DIFF WBC: CPT | Performed by: EMERGENCY MEDICINE

## 2024-10-17 PROCEDURE — 83735 ASSAY OF MAGNESIUM: CPT | Performed by: EMERGENCY MEDICINE

## 2024-10-17 PROCEDURE — 82435 ASSAY OF BLOOD CHLORIDE: CPT | Performed by: EMERGENCY MEDICINE

## 2024-10-17 PROCEDURE — 93005 ELECTROCARDIOGRAM TRACING: CPT | Performed by: EMERGENCY MEDICINE

## 2024-10-17 PROCEDURE — 84703 CHORIONIC GONADOTROPIN ASSAY: CPT | Performed by: EMERGENCY MEDICINE

## 2024-10-17 PROCEDURE — 99284 EMERGENCY DEPT VISIT MOD MDM: CPT

## 2024-10-17 PROCEDURE — 36415 COLL VENOUS BLD VENIPUNCTURE: CPT | Performed by: EMERGENCY MEDICINE

## 2024-10-17 RX ORDER — FAMOTIDINE 20 MG/1
20 TABLET, FILM COATED ORAL 2 TIMES DAILY
Qty: 60 TABLET | Refills: 0 | Status: SHIPPED | OUTPATIENT
Start: 2024-10-17 | End: 2024-11-16

## 2024-10-17 RX ORDER — SUCRALFATE ORAL 1 G/10ML
1 SUSPENSION ORAL 4 TIMES DAILY PRN
Qty: 414 ML | Refills: 0 | Status: SHIPPED | OUTPATIENT
Start: 2024-10-17

## 2024-10-17 ASSESSMENT — ACTIVITIES OF DAILY LIVING (ADL)
ADLS_ACUITY_SCORE: 35
ADLS_ACUITY_SCORE: 33

## 2024-10-17 ASSESSMENT — COLUMBIA-SUICIDE SEVERITY RATING SCALE - C-SSRS
1. IN THE PAST MONTH, HAVE YOU WISHED YOU WERE DEAD OR WISHED YOU COULD GO TO SLEEP AND NOT WAKE UP?: NO
6. HAVE YOU EVER DONE ANYTHING, STARTED TO DO ANYTHING, OR PREPARED TO DO ANYTHING TO END YOUR LIFE?: NO
2. HAVE YOU ACTUALLY HAD ANY THOUGHTS OF KILLING YOURSELF IN THE PAST MONTH?: NO

## 2024-10-18 NOTE — ED TRIAGE NOTES
Pt presents to the ED with c/o episodes of being dizzy, numbness/tingling left arm, flushed/hot feeling. Pt states that it happened last week for the first time. Pt states since yesterday, the episodes occur right after eating. Pt also endorses blurriness in both eyes.      Triage Assessment (Adult)       Row Name 10/17/24 1911          Triage Assessment    Airway WDL WDL        Respiratory WDL    Respiratory WDL WDL        Skin Circulation/Temperature WDL    Skin Circulation/Temperature WDL WDL        Cardiac WDL    Cardiac WDL WDL        Peripheral/Neurovascular WDL    Peripheral Neurovascular WDL X;neurovascular assessment upper        Cognitive/Neuro/Behavioral WDL    Cognitive/Neuro/Behavioral WDL WDL        Hector Coma Scale    Best Eye Response 4-->(E4) spontaneous     Best Motor Response 6-->(M6) obeys commands     Best Verbal Response 5-->(V5) oriented     Hector Coma Scale Score 15        LUE Neurovascular Assessment    Sensation LUE tingling present;numbness present

## 2024-10-18 NOTE — ED PROVIDER NOTES
EMERGENCY DEPARTMENT ENCOUNTER     NAME: Annette Dietz   AGE: 39 year old female   YOB: 1985   MRN: [id number]   EVALUATION DATE & TIME: No admission date for patient encounter.   PCP: Rachael Fowler     Chief Complaint   Patient presents with    Dizziness    Tingling   :    FINAL IMPRESSION       1. Lightheadedness    2. Paresthesia           ED COURSE & MEDICAL DECISION MAKING      Pertinent Labs & Imaging studies reviewed. (See chart for details)   39 year old female  presents to the Emergency Department for evaluation of ongoing episodes where when she eats food she starts to feel stomach discomfort and chest pressure and then notes that she starts to breathe fast, feels lightheaded, and feels numbness in her hands. Initial Vitals Reviewed. Initial exam notable for patient is currently asymptomatic, well-appearing with normal vitals.  Patient expressing concerns that this may have to do with her cholesterol and I did discuss that cholesterol abnormalities do not cause physical symptoms and provided a lot of reassurance.  On chart review, it looks like she has a history of GERD in the past but is not on any medications right now, and the associating with eating makes me wonder if this is a flareup of her GERD which is then causing symptoms that caused her to hyperventilate and get anxious.  She notes that this is the likely cause as when it happens she feels very scared.  I did do a cardiac workup which is reassuring and I do not suspect ACS at this time.  Labs do not show pancreatitis, cardiac monitoring did not reveal an arrhythmia.  At this time, I did a lot of reassurance with her and I am going to represcribe Carafate and Pepcid and have her start this therapy to see if it stops the underlying discomfort that happens when she eats and then causes these panic attacks.  She is comfortable with this plan and I did give her information to follow-up with GI if this is not improving as well.            At the conclusion of the encounter I discussed the results of all of the tests and the disposition. The questions were answered. The patient or family acknowledged understanding and was agreeable with the care plan.     0 minutes critical care time, see procedure note below for details if relevant    7:30 PM I met the patient and performed my initial interview and exam.   9:04 PM Rechecked and updated patient. We discussed plan for discharge and all questions were answered.    Medical Decision Making    History:  Supplemental history from: Documented in chart  External Record(s) reviewed: Documented in chart, ED visit for lightheadedness 10/7/2024    Work Up:  Chart documentation includes differential considered and any EKGs or imaging independently interpreted by provider, where specified.  In additional to work up documented, I considered the following work up: Documented in chart, if applicable.    External consultation:  Discussion of management with another provider: Documented in chart, if applicable    Complicating factors:  Care impacted by chronic illness: Hyperlipidemia, Hypertension, and Other: GERD  Care affected by social determinants of health: Access to Medical Care    Disposition considerations: Discharge. I prescribed additional prescription strength medication(s) as charted. I considered admission, but ultimately discharged patient with reassuring workup and outpatient follow-up plan.    Not Applicable            MEDICATIONS GIVEN IN THE EMERGENCY:   Medications - No data to display   NEW PRESCRIPTIONS STARTED AT TODAY'S ER VISIT   New Prescriptions    FAMOTIDINE (PEPCID) 20 MG TABLET    Take 1 tablet (20 mg) by mouth 2 times daily.    SUCRALFATE (CARAFATE) 1 GM/10ML SUSPENSION    Take 10 mLs (1 g) by mouth 4 times daily as needed (stomach discomfort).     ================================================================   HISTORY OF PRESENT ILLNESS       Patient information was obtained from:  "patient   Use of Intrepreter: N/A   Annette Dietz is a 39 year old female with history of hypertension, hyperlipidemia, GERD, who presents via walk-in for evaluation of near syncope and tingling.    Yesterday at lunch time, the patient ate a cheesecake and soon after felt her chest \"acting weird\" with hyperventilation, dark vision, left sided tingling, and near syncope. She had no actual loss of consciousness. She drank some water and then it improved. The same thing happened with eating last night and today at lunch, with water resolving it those times as well. She is worried that this is related to her hyperlipidemia.    The patient has a primary care provider appointment on 21-Oct-2024. She has been drinking fine and denies abdominal pain.     ================================================================    Per chart review, the patient presented to [facility] ER on 07-Oct-2024 for evaluation of lightheadedness. EKG, CBC, CMP, and UA were all reassuring. Patient felt improved and comfortable with discharge.    PAST HISTORY     PAST MEDICAL HISTORY:   History reviewed. No pertinent past medical history.   PAST SURGICAL HISTORY:   Past Surgical History:   Procedure Laterality Date    WISDOM TOOTH EXTRACTION      no comp with GA      CURRENT MEDICATIONS:   famotidine (PEPCID) 20 MG tablet  sucralfate (CARAFATE) 1 GM/10ML suspension  cholecalciferol, vitamin D3, 5,000 unit Tab  famotidine (PEPCID) 40 MG tablet  hydrOXYzine (ATARAX) 25 MG tablet  hydrOXYzine (VISTARIL) 25 MG capsule  MEDICATION CANNOT BE REORDERED - PLEASE MANUALLY REORDER AND DISCONTINUE THE OLD ORDER  PRENATAL VIT W-CA,FE,FA,<1 MG, (PRENATAL VITAMIN ORAL)  sucralfate (CARAFATE) 1 GM tablet      ALLERGIES:   No Known Allergies   FAMILY HISTORY:   Family History   Problem Relation Age of Onset    Depression Mother     Hypertension Father     Hepatitis Brother     No Known Problems Sister     No Known Problems Daughter     No Known Problems Son     No " Known Problems Paternal Grandmother     No Known Problems Sister       SOCIAL HISTORY:   Social History     Socioeconomic History    Marital status: Single   Tobacco Use    Smoking status: Never   Substance and Sexual Activity    Alcohol use: No    Drug use: No    Sexual activity: Yes     Partners: Male     Birth control/protection: None     Social Determinants of Health     Financial Resource Strain: Low Risk  (1/19/2024)    [facility]    Financial Resource Strain     Difficulty of Paying Living Expenses: 3   Food Insecurity: No Food Insecurity (1/19/2024)    [facility]    Food Insecurity     Worried About Running Out of Food in the Last Year: 1   Transportation Needs: No Transportation Needs (1/19/2024)    [facility] [facility]    Transportation Needs     Lack of Transportation (Medical): 1   Social Connections: Socially Integrated (1/19/2024)    Received from [employer] [facility]    Social Connections     Frequency of Communication with Friends and Family: 0   Housing Stability: Low Risk  (1/19/2024)    [facility]    Housing Stability     Unable to Pay for Housing in the Last Year: 1        VITALS  Patient Vitals for the past 24 hrs:   BP Temp Temp src Pulse Resp SpO2 Weight   10/17/24 2100 -- -- -- 59 23 100 % --   10/17/24 2045 -- -- -- 59 -- -- --   10/17/24 2030 134/79 -- -- 62 -- -- --   10/17/24 1909 (!) 176/106 98.5  F (36.9  C) Temporal 80 18 99 % 59 kg (130 lb)        ================================================================    PHYSICAL EXAM     VITAL SIGNS: /79   Pulse 59   Temp 98.5  F (36.9  C) (Temporal)   Resp 23   Wt 59 kg (130 lb)   SpO2 100%   BMI 24.56 kg/m     Constitutional:  Awake, no acute distress   HENT:  Atraumatic, oropharynx without exudate or erythema, membranes  moist  Lymph:  No adenopathy  Eyes: EOM intact, PERRL, no injection  Neck: Supple  Respiratory:  Clear to auscultation bilaterally, no wheezes or crackles   Cardiovascular:  Regular rate and rhythm, single S1 and S2   GI:  Soft, nontender, nondistended, no rebound or guarding   Musculoskeletal:  Moves all extremities, no lower extremity edema, no deformities    Skin:  Warm, dry  Neurologic:  Alert and oriented x3, no focal deficits noted       ================================================================  LAB       All pertinent labs reviewed and interpreted.   Labs Ordered and Resulted from Time of ED Arrival to Time of ED Departure   COMPREHENSIVE METABOLIC PANEL - Abnormal       Result Value    Sodium 138      Potassium 3.5      Carbon Dioxide (CO2) 24      Anion Gap 11      Urea Nitrogen 15.8      Creatinine 0.74      GFR Estimate >90      Calcium 9.2      Chloride 103      Glucose 128 (*)     Alkaline Phosphatase 63      AST 38      ALT 66 (*)     Protein Total 7.0      Albumin 4.3      Bilirubin Total 0.4     TROPONIN T, HIGH SENSITIVITY - Normal    Troponin T, High Sensitivity <6     MAGNESIUM - Normal    Magnesium 1.9     TSH WITH FREE T4 REFLEX - Normal    TSH 3.04     HCG QUALITATIVE PREGNANCY - Normal    hCG Serum Qualitative Negative     LIPASE - Normal    Lipase 36     CBC WITH PLATELETS AND DIFFERENTIAL    WBC Count 8.2      RBC Count 4.34      Hemoglobin 12.7      Hematocrit 38.8      MCV 89      MCH 29.3      MCHC 32.7      RDW 12.1      Platelet Count 226      % Neutrophils 70      % Lymphocytes 19      % Monocytes 8      % Eosinophils 2      % Basophils 1      % Immature Granulocytes 0      NRBCs per 100 WBC 0      Absolute Neutrophils 5.7      Absolute Lymphocytes 1.6      Absolute Monocytes 0.7      Absolute Eosinophils 0.1      Absolute Basophils 0.1      Absolute Immature Granulocytes 0.0      Absolute NRBCs 0.0           ===============================================================  RADIOLOGY       Reviewed all pertinent imaging. Please see official radiology report.   No orders to display         ================================================================  EKG     EKG reviewed interpreted by me shows sinus rhythm with rate of 74, normal axis, QTc 401 with no acute ST or T wave changes since previous with T wave inversion in lead III that looks similar to January 9 EKG    I have independently reviewed and interpreted the EKG(s) documented above.     ================================================================  PROCEDURES         I, Nelson Belle, am serving as a scribe to document services personally performed by Dr. Britt based on my observation and the provider's statements to me. I, Ledy Britt MD attest that Nelson Belle is acting in a scribe capacity, has observed my performance of the services and has documented them in accordance with my direction.   Ledy Britt M.D.   Emergency Medicine   [facility] EMERGENCY ROOM  [address] [facility] [id number]  [phone]  Dept: [phone]      Ledy Britt MD  10/17/24 2115

## 2024-10-18 NOTE — DISCHARGE INSTRUCTIONS
Fortunately, all of the labs are reassuring.  Like we discussed, it sounds like what may be happening is that you are experiencing some GI symptoms when you eat and then it causes you to hyperventilate and get very anxious.  I am going to prescribe some medication to try and help your stomach and I recommend trying this a few times a day to see if calming the stomach helps tolerate food better.  If this continues to be a problem, seeing a GI doctor is the next step for an endoscopy.

## 2025-03-29 ENCOUNTER — HEALTH MAINTENANCE LETTER (OUTPATIENT)
Age: 40
End: 2025-03-29